# Patient Record
Sex: FEMALE | Race: WHITE | NOT HISPANIC OR LATINO | Employment: OTHER | URBAN - METROPOLITAN AREA
[De-identification: names, ages, dates, MRNs, and addresses within clinical notes are randomized per-mention and may not be internally consistent; named-entity substitution may affect disease eponyms.]

---

## 2019-02-25 DIAGNOSIS — E11.22 TYPE 2 DIABETES MELLITUS WITH CHRONIC KIDNEY DISEASE ON CHRONIC DIALYSIS, WITHOUT LONG-TERM CURRENT USE OF INSULIN: Primary | ICD-10-CM

## 2019-02-25 DIAGNOSIS — N18.6 TYPE 2 DIABETES MELLITUS WITH CHRONIC KIDNEY DISEASE ON CHRONIC DIALYSIS, WITHOUT LONG-TERM CURRENT USE OF INSULIN: Primary | ICD-10-CM

## 2019-02-25 DIAGNOSIS — Z99.2 TYPE 2 DIABETES MELLITUS WITH CHRONIC KIDNEY DISEASE ON CHRONIC DIALYSIS, WITHOUT LONG-TERM CURRENT USE OF INSULIN: Primary | ICD-10-CM

## 2019-03-19 ENCOUNTER — OUTSIDE PLACE OF SERVICE (OUTPATIENT)
Dept: ADMINISTRATIVE | Facility: OTHER | Age: 64
End: 2019-03-19
Payer: COMMERCIAL

## 2019-03-19 PROCEDURE — 49325 LAP REVISION PERM IP CATH: CPT | Mod: ,,, | Performed by: SURGERY

## 2019-03-19 PROCEDURE — 49325 PR LAP REVISE INTRAPERITONEAL CATHETER: ICD-10-PCS | Mod: ,,, | Performed by: SURGERY

## 2019-03-25 ENCOUNTER — OFFICE VISIT (OUTPATIENT)
Dept: INTERNAL MEDICINE | Facility: CLINIC | Age: 64
End: 2019-03-25
Payer: COMMERCIAL

## 2019-03-25 VITALS
BODY MASS INDEX: 40.57 KG/M2 | DIASTOLIC BLOOD PRESSURE: 63 MMHG | SYSTOLIC BLOOD PRESSURE: 105 MMHG | WEIGHT: 229 LBS | OXYGEN SATURATION: 96 % | HEIGHT: 63 IN | TEMPERATURE: 97 F | HEART RATE: 56 BPM

## 2019-03-25 DIAGNOSIS — E11.22 TYPE 2 DIABETES MELLITUS WITH CHRONIC KIDNEY DISEASE ON CHRONIC DIALYSIS, WITHOUT LONG-TERM CURRENT USE OF INSULIN: ICD-10-CM

## 2019-03-25 DIAGNOSIS — J45.909 ASTHMA, UNSPECIFIED ASTHMA SEVERITY, UNSPECIFIED WHETHER COMPLICATED, UNSPECIFIED WHETHER PERSISTENT: ICD-10-CM

## 2019-03-25 DIAGNOSIS — I10 BENIGN ESSENTIAL HTN: ICD-10-CM

## 2019-03-25 DIAGNOSIS — E78.00 PURE HYPERCHOLESTEROLEMIA: Primary | ICD-10-CM

## 2019-03-25 DIAGNOSIS — M79.7 FIBROMYALGIA: ICD-10-CM

## 2019-03-25 DIAGNOSIS — Z99.2 TYPE 2 DIABETES MELLITUS WITH CHRONIC KIDNEY DISEASE ON CHRONIC DIALYSIS, WITHOUT LONG-TERM CURRENT USE OF INSULIN: ICD-10-CM

## 2019-03-25 DIAGNOSIS — E53.8 B12 DEFICIENCY: ICD-10-CM

## 2019-03-25 DIAGNOSIS — N18.6 TYPE 2 DIABETES MELLITUS WITH CHRONIC KIDNEY DISEASE ON CHRONIC DIALYSIS, WITHOUT LONG-TERM CURRENT USE OF INSULIN: ICD-10-CM

## 2019-03-25 LAB — GLUCOSE SERPL-MCNC: 182 MG/DL (ref 70–110)

## 2019-03-25 PROCEDURE — 99214 OFFICE O/P EST MOD 30 MIN: CPT | Mod: S$GLB,,, | Performed by: INTERNAL MEDICINE

## 2019-03-25 PROCEDURE — 82962 POCT GLUCOSE, HAND-HELD DEVICE: ICD-10-PCS | Mod: ,,, | Performed by: INTERNAL MEDICINE

## 2019-03-25 PROCEDURE — 82962 GLUCOSE BLOOD TEST: CPT | Mod: ,,, | Performed by: INTERNAL MEDICINE

## 2019-03-25 PROCEDURE — 99214 PR OFFICE/OUTPT VISIT, EST, LEVL IV, 30-39 MIN: ICD-10-PCS | Mod: S$GLB,,, | Performed by: INTERNAL MEDICINE

## 2019-03-25 RX ORDER — ALLOPURINOL 300 MG/1
TABLET ORAL
Refills: 3 | COMMUNITY
Start: 2019-03-06 | End: 2019-05-13

## 2019-03-25 RX ORDER — AMLODIPINE AND OLMESARTAN MEDOXOMIL 10; 20 MG/1; MG/1
TABLET ORAL
COMMUNITY
Start: 2018-09-17 | End: 2019-03-25

## 2019-03-25 RX ORDER — ASPIRIN 81 MG/1
TABLET ORAL
COMMUNITY
End: 2022-08-23

## 2019-03-25 RX ORDER — AZITHROMYCIN 250 MG/1
TABLET, FILM COATED ORAL
Refills: 0 | COMMUNITY
Start: 2019-03-21 | End: 2019-06-25

## 2019-03-25 RX ORDER — ESCITALOPRAM OXALATE 5 MG/1
TABLET ORAL
COMMUNITY
End: 2019-05-13

## 2019-03-25 RX ORDER — GLIMEPIRIDE 4 MG/1
TABLET ORAL
COMMUNITY
Start: 2018-09-26 | End: 2020-02-28 | Stop reason: SDUPTHER

## 2019-03-25 RX ORDER — ROSUVASTATIN CALCIUM 10 MG/1
TABLET, COATED ORAL
Refills: 4 | COMMUNITY
Start: 2019-03-06 | End: 2019-08-15 | Stop reason: SDUPTHER

## 2019-03-25 RX ORDER — FUROSEMIDE 40 MG/1
TABLET ORAL
Refills: 2 | COMMUNITY
Start: 2018-12-20 | End: 2019-07-01

## 2019-03-25 RX ORDER — PANTOPRAZOLE SODIUM 40 MG/1
TABLET, DELAYED RELEASE ORAL
Refills: 3 | COMMUNITY
Start: 2019-03-21 | End: 2019-05-13

## 2019-03-25 RX ORDER — TIZANIDINE 4 MG/1
4 TABLET ORAL NIGHTLY
Refills: 3 | COMMUNITY
Start: 2019-03-06 | End: 2019-05-31 | Stop reason: SDUPTHER

## 2019-03-25 RX ORDER — DULOXETIN HYDROCHLORIDE 60 MG/1
1 CAPSULE, DELAYED RELEASE ORAL DAILY
COMMUNITY
End: 2019-05-13

## 2019-03-25 RX ORDER — CARVEDILOL 12.5 MG/1
TABLET ORAL
COMMUNITY
End: 2019-05-13

## 2019-03-25 RX ORDER — HYDROCODONE BITARTRATE AND ACETAMINOPHEN 5; 325 MG/1; MG/1
TABLET ORAL
Refills: 0 | COMMUNITY
Start: 2019-03-22 | End: 2019-09-17

## 2019-03-25 RX ORDER — OMEGA-3-ACID ETHYL ESTERS 1 G/1
CAPSULE, LIQUID FILLED ORAL
Refills: 4 | COMMUNITY
Start: 2019-02-28 | End: 2019-08-29 | Stop reason: SDUPTHER

## 2019-03-25 RX ORDER — CYANOCOBALAMIN 1000 UG/ML
1 INJECTION, SOLUTION INTRAMUSCULAR; SUBCUTANEOUS
Refills: 1 | COMMUNITY
Start: 2019-01-14 | End: 2020-02-28 | Stop reason: SDUPTHER

## 2019-03-25 NOTE — PROGRESS NOTES
Subjective:      Patient ID: Tiffany Adams is a 63 y.o. female.    Chief Complaint: Diabetes and Follow-up    HPI: Patient with h/o DM 12 years BS are under good control. Fasting BS are running . Patient HbA1c = 6.1. Patient reports polyuria, polydipsia, no Numbness in hands or feet. Patient is being followed by Ophthalmologist and Podiatrist. Patient has developed Chronic Kidney disease and is recently started on HD about 2 months ago, patient is getting HD Mon/Wed/Friday Patient had Peritoneal Dialysis planned and and Intra-abdominal tubes are placed.       Pt was found to also have borderline B12 levels and low Folate. Patient is started on Folate and b12 and reports improved energy. Patient also had iron infusion in Nov. Patient is off of B12 shots.      Patient has h/o HTN and the BP are onlower side. Patient reports BP dropping during HD.       Patient has h/o fibromyalgia and is taking Cymbalta with much help. Depression is much better controlled with Lexpro. NO crying spells, lack of energy/interest, no feeling guilt and worthlessness.    Review of Systems   Constitutional: Positive for weight loss (10 lbs). Negative for chills, diaphoresis, fever and malaise/fatigue.   HENT: Negative for congestion, ear pain, sinus pain, sore throat and tinnitus.    Eyes: Negative for blurred vision and photophobia.   Respiratory: Negative for cough, hemoptysis, shortness of breath and wheezing.    Cardiovascular: Negative for chest pain, palpitations, orthopnea, leg swelling and PND.   Gastrointestinal: Negative for abdominal pain, blood in stool, constipation, diarrhea, heartburn, melena, nausea and vomiting.   Genitourinary: Negative for dysuria, frequency and urgency.   Musculoskeletal: Negative for back pain, myalgias and neck pain.   Skin: Negative for rash.   Neurological: Positive for dizziness (with changing position quickly. ). Negative for tremors, seizures, loss of consciousness and weakness.    Endo/Heme/Allergies: Negative for polydipsia.   Psychiatric/Behavioral: Negative for depression and hallucinations. The patient does not have insomnia.      Objective:     Physical Exam   Constitutional: She is oriented to person, place, and time. No distress.   Looks pale and lethargic    Neck: No thyromegaly present.   Tunnel catheter on right side of the Neck. Probably Right Jugular Vein.    Cardiovascular: Normal rate, regular rhythm and normal heart sounds.   No murmur heard.  Pulmonary/Chest: Effort normal and breath sounds normal. No respiratory distress. She has no wheezes.   Abdominal: Soft. Bowel sounds are normal. She exhibits no distension. There is no tenderness.   Peritoneal Dialysis catheter is noted in abdomen.    Musculoskeletal: She exhibits no edema.   Lymphadenopathy:     She has no cervical adenopathy.   Neurological: She is alert and oriented to person, place, and time.   Skin: She is not diaphoretic.   Psychiatric: She has a normal mood and affect. Her behavior is normal. Judgment and thought content normal.     Assessment:     1. Type 2 diabetes mellitus without complication, with long-term current use of insulin    2. Pure hypercholesterolemia    3. Type 2 diabetes mellitus with chronic kidney disease on chronic dialysis, without long-term current use of insulin    4. Asthma, unspecified asthma severity, unspecified whether complicated, unspecified whether persistent    5. Malignant neoplasm of left kidney    6. Fibromyalgia       Plan:   Patient blood sugars seem under okay control.  Will continue medication..  Will check LDL again  Patient has history of B12 deficiency.  Will check levels again.  Patient asthma is under control will continue medication  Fibromyalgia is controlled with Cymbalta.  Will continue the medication.  Patient blood pressures are running on the lower side and during hemodialysis blood pressure to drop.  Will stop amlodipine olmesartan  Patient is also losing  weight and that can also contribute with low blood pressure.  Office Visit on 03/25/2019   Component Date Value Ref Range Status    POC Glucose 03/25/2019 182* 70 - 110 MG/DL Final

## 2019-03-26 LAB
ABS NRBC COUNT: 0 X 10 3/UL (ref 0–0.01)
ABSOLUTE BASOPHIL: 0.05 X 10 3/UL (ref 0–0.22)
ABSOLUTE EOSINOPHIL: 0.31 X 10 3/UL (ref 0.04–0.54)
ABSOLUTE IMMATURE GRAN: 0.04 X 10 3/UL (ref 0–0.04)
ABSOLUTE LYMPHOCYTE: 0.61 X 10 3/UL (ref 0.86–4.75)
ABSOLUTE MONOCYTE: 0.64 X 10 3/UL (ref 0.22–1.08)
ALBUMIN SERPL-MCNC: 3.6 G/DL (ref 3.5–5.2)
ALBUMIN/GLOB SERPL ELPH: 1.2 {RATIO} (ref 1–2.7)
ALP ISOS SERPL LEV INH-CCNC: 126 IU/L (ref 35–105)
ALT (SGPT): 8 U/L (ref 0–33)
ANION GAP SERPL CALC-SCNC: 14 MMOL/L (ref 8–17)
AST SERPL-CCNC: 10 U/L (ref 0–32)
BASOPHILS NFR BLD: 0.6 %
BILIRUBIN, TOTAL: 0.32 MG/DL (ref 0–1.2)
BUN/CREAT SERPL: 8.6 (ref 6–20)
CALCIUM SERPL-MCNC: 9.7 MG/DL (ref 8.6–10.2)
CARBON DIOXIDE, CO2: 32 MMOL/L (ref 22–29)
CHLORIDE: 98 MMOL/L (ref 98–107)
CHOLEST SERPL-MSCNC: 119 MG/DL (ref 100–200)
CREAT SERPL-MCNC: 2.69 MG/DL (ref 0.5–0.9)
CREATININE RANDOM URINE: 283.3 MG/DL (ref 28–217)
EOSINOPHIL NFR BLD: 3.4 %
ESTIMATED AVERAGE GLUCOSE: 117 MG/DL
GFR ESTIMATION: 17.76
GLOBULIN: 3.1 G/DL (ref 1.5–4.5)
GLUCOSE: 132 MG/DL (ref 82–115)
HBA1C MFR BLD: 5.7 % (ref 4–6)
HCT VFR BLD AUTO: 30.5 % (ref 37–47)
HDLC SERPL-MCNC: 35 MG/DL
HGB BLD-MCNC: 9.8 G/DL (ref 12–16)
IMMATURE GRANULOCYTES: 0.4 % (ref 0–0.5)
LDL/HDL RATIO: 1.2 (ref 1–3)
LDLC SERPL CALC-MCNC: 42.6 MG/DL (ref 0–100)
LYMPHOCYTES NFR BLD: 6.7 %
MCH RBC QN AUTO: 31.7 PG (ref 27–32)
MCHC RBC AUTO-ENTMCNC: 32.1 G/DL (ref 32–36)
MCV RBC AUTO: 98.7 FL (ref 82–100)
MICROALBUMIN QUANT: 595.2 MG/DL (ref 0–2)
MICROALBUMIN/CREATININE RATIO: 2100.95 UG/MG (ref 0–30)
MONOCYTES NFR BLD: 7.1 %
NEUTROPHILS ABSOLUTE COUNT: 7.42 X 10 3/UL (ref 2.15–7.56)
NEUTROPHILS NFR BLD: 81.8 %
NUCLEATED RED BLOOD CELLS: 0 /100 WBC (ref 0–0.2)
PLATELET # BLD AUTO: 338 X 10 3/UL (ref 135–400)
POTASSIUM: 4 MMOL/L (ref 3.5–5.1)
PROT SNV-MCNC: 6.7 G/DL (ref 6.4–8.3)
RBC # BLD AUTO: 3.09 X 10 6/UL (ref 4.2–5.4)
RDW-SD: 49.5 FL (ref 37–54)
SODIUM: 144 MMOL/L (ref 136–145)
TRIGL SERPL-MCNC: 207 MG/DL (ref 0–150)
TSH SERPL DL<=0.005 MIU/L-ACNC: 2 UIU/ML (ref 0.27–4.2)
UREA NITROGEN (BUN): 23.1 MG/DL (ref 8–23)
WBC # BLD: 9.07 X 10 3/UL (ref 4.3–10.8)

## 2019-03-27 ENCOUNTER — OFFICE VISIT (OUTPATIENT)
Dept: SURGERY | Facility: CLINIC | Age: 64
End: 2019-03-27
Payer: MEDICARE

## 2019-03-27 DIAGNOSIS — Z98.890 POST-OPERATIVE STATE: Primary | ICD-10-CM

## 2019-03-27 PROCEDURE — 99024 PR POST-OP FOLLOW-UP VISIT: ICD-10-PCS | Mod: S$GLB,POP,, | Performed by: SURGERY

## 2019-03-27 PROCEDURE — 99024 POSTOP FOLLOW-UP VISIT: CPT | Mod: S$GLB,POP,, | Performed by: SURGERY

## 2019-03-27 NOTE — PROGRESS NOTES
HPI:  Postoperative revisit status post exterior is a shin of peritoneal dialysis catheter complicated by catheter malfunction due to intra-abdominal adhesions.  Diagnostic laparoscopy was performed in adhesiolysis was performed at that time.  Still could not get the catheter to function properly therefore catheter was reinserted and exteriorized.  Postop she had some problems with what sounded like a mild peritonitis but she is feeling better now not having fevers and much less abdominal tenderness.    PHYSICAL EXAM:  Abdomen is nondistended with mild diffuse tenderness.  Staples were removed from the incisions and Steri-Strips applied there is noted to be some bloody fluid in the peritoneal dialysis catheter segment that is external.  ASSESSMENT:    Appears to be improving after diagnostic laparoscopy and repositioning and reinserting the peritoneal catheter.  PLAN:      She is to get the catheter flushed today by the dialysis nurse I have already talked to them about not putting a large volume in until healing occurs around the catheter insertion site.

## 2019-04-15 ENCOUNTER — OFFICE VISIT (OUTPATIENT)
Dept: SURGERY | Facility: CLINIC | Age: 64
End: 2019-04-15
Payer: MEDICARE

## 2019-04-15 DIAGNOSIS — T85.691D: Primary | ICD-10-CM

## 2019-04-15 PROCEDURE — 99024 PR POST-OP FOLLOW-UP VISIT: ICD-10-PCS | Mod: S$GLB,POP,, | Performed by: SURGERY

## 2019-04-15 PROCEDURE — 99024 POSTOP FOLLOW-UP VISIT: CPT | Mod: S$GLB,POP,, | Performed by: SURGERY

## 2019-04-15 NOTE — PROGRESS NOTES
HPI:  63-year-old white female with peritoneal dialysis catheter recently inserted.  It is not functioning.  Fluid will go in but there is no return.  A plain x-ray was done which does show the catheter in the central abdominal region. She has known history of intra-abdominal adhesions from a recent diagnostic laparoscopy when we exteriorized the catheter.    PHYSICAL EXAM:  Abdomen is obese, soft and nontender.  Catheter exiting left lower quadrant abdominal wall  ASSESSMENT:    Nonfunctioning peritoneal dialysis catheter.  History of intra-abdominal adhesions.  PLAN:      Surgical options discussed with patient. We can put a laparoscoped and see if there is something plug in the catheter or if the adhesions are again 2 severe to allow for peritoneal dialysis to be successful.  If adhesions are again present the way they were the last time that I will remove the catheter in deem her a poor candidate for peritoneal dialysis.

## 2019-04-30 ENCOUNTER — OUTSIDE PLACE OF SERVICE (OUTPATIENT)
Dept: ADMINISTRATIVE | Facility: OTHER | Age: 64
End: 2019-04-30
Payer: COMMERCIAL

## 2019-04-30 PROCEDURE — 49422 PR REMOVAL TUNNELED INTRAPERITONEAL CATHETER: ICD-10-PCS | Mod: ,,, | Performed by: SURGERY

## 2019-04-30 PROCEDURE — 49422 REMOVE TUNNELED IP CATH: CPT | Mod: ,,, | Performed by: SURGERY

## 2019-05-09 DIAGNOSIS — I10 ESSENTIAL HYPERTENSION: Primary | ICD-10-CM

## 2019-05-09 RX ORDER — CARVEDILOL 12.5 MG/1
12.5 TABLET ORAL 2 TIMES DAILY WITH MEALS
Qty: 60 TABLET | Refills: 11 | Status: SHIPPED | OUTPATIENT
Start: 2019-05-09 | End: 2019-05-13

## 2019-05-13 ENCOUNTER — OFFICE VISIT (OUTPATIENT)
Dept: SURGERY | Facility: CLINIC | Age: 64
End: 2019-05-13
Payer: MEDICARE

## 2019-05-13 DIAGNOSIS — K21.9 GASTROESOPHAGEAL REFLUX DISEASE WITHOUT ESOPHAGITIS: ICD-10-CM

## 2019-05-13 DIAGNOSIS — F32.89 OTHER DEPRESSION: ICD-10-CM

## 2019-05-13 DIAGNOSIS — I10 ESSENTIAL HYPERTENSION: ICD-10-CM

## 2019-05-13 DIAGNOSIS — Z98.890 POST-OPERATIVE STATE: Primary | ICD-10-CM

## 2019-05-13 DIAGNOSIS — M79.7 FIBROMYALGIA: ICD-10-CM

## 2019-05-13 DIAGNOSIS — M10.9 GOUT, UNSPECIFIED CAUSE, UNSPECIFIED CHRONICITY, UNSPECIFIED SITE: Primary | ICD-10-CM

## 2019-05-13 PROCEDURE — 99024 PR POST-OP FOLLOW-UP VISIT: ICD-10-PCS | Mod: S$GLB,POP,, | Performed by: SURGERY

## 2019-05-13 PROCEDURE — 99024 POSTOP FOLLOW-UP VISIT: CPT | Mod: S$GLB,POP,, | Performed by: SURGERY

## 2019-05-13 RX ORDER — CARVEDILOL 12.5 MG/1
12.5 TABLET ORAL 2 TIMES DAILY WITH MEALS
Qty: 60 TABLET | Refills: 11 | Status: SHIPPED | OUTPATIENT
Start: 2019-05-13 | End: 2020-05-12

## 2019-05-13 RX ORDER — DULOXETIN HYDROCHLORIDE 60 MG/1
60 CAPSULE, DELAYED RELEASE ORAL DAILY
Qty: 30 CAPSULE | Refills: 11 | Status: SHIPPED | OUTPATIENT
Start: 2019-05-13 | End: 2020-05-06 | Stop reason: SDUPTHER

## 2019-05-13 RX ORDER — PANTOPRAZOLE SODIUM 40 MG/1
40 TABLET, DELAYED RELEASE ORAL DAILY
Qty: 30 TABLET | Refills: 11 | Status: SHIPPED | OUTPATIENT
Start: 2019-05-13 | End: 2020-02-28 | Stop reason: SDUPTHER

## 2019-05-13 RX ORDER — ALLOPURINOL 300 MG/1
300 TABLET ORAL DAILY
Qty: 30 TABLET | Refills: 11 | Status: SHIPPED | OUTPATIENT
Start: 2019-05-13 | End: 2019-07-08

## 2019-05-13 RX ORDER — TIZANIDINE 4 MG/1
4 TABLET ORAL EVERY 6 HOURS PRN
Qty: 30 TABLET | Refills: 1 | OUTPATIENT
Start: 2019-05-13 | End: 2019-05-23

## 2019-05-13 NOTE — PROGRESS NOTES
HPI:  Postoperative visit status post removal of peritoneal dialysis catheter.  Here for staple removal    PHYSICAL EXAM:  Abdomen soft nontender incisions with some redness related to the staples.  All staples are removed and Steri-Strips applied  ASSESSMENT:    Stable postoperative course  PLAN:    Pre visit as needed.  Patient deemed non candidate for peritoneal dialysis due to intra-abdominal adhesions.

## 2019-05-16 DIAGNOSIS — E11.22 TYPE 2 DIABETES MELLITUS WITH END-STAGE RENAL DISEASE: Primary | ICD-10-CM

## 2019-05-16 DIAGNOSIS — N18.6 TYPE 2 DIABETES MELLITUS WITH END-STAGE RENAL DISEASE: Primary | ICD-10-CM

## 2019-05-17 RX ORDER — LANCETS 33 GAUGE
EACH MISCELLANEOUS
Qty: 100 EACH | Refills: 3 | Status: SHIPPED | OUTPATIENT
Start: 2019-05-17 | End: 2023-01-26 | Stop reason: SDUPTHER

## 2019-05-17 RX ORDER — DEXTROSE 4 G
TABLET,CHEWABLE ORAL
Qty: 1 EACH | Refills: 0 | Status: SHIPPED | OUTPATIENT
Start: 2019-05-17 | End: 2019-07-08

## 2019-05-31 DIAGNOSIS — M79.7 FIBROMYALGIA: Primary | ICD-10-CM

## 2019-05-31 RX ORDER — TIZANIDINE 4 MG/1
4 TABLET ORAL NIGHTLY
Qty: 30 TABLET | Refills: 3 | Status: SHIPPED | OUTPATIENT
Start: 2019-05-31 | End: 2019-10-22 | Stop reason: SDUPTHER

## 2019-06-13 ENCOUNTER — TELEPHONE (OUTPATIENT)
Dept: INTERNAL MEDICINE | Facility: CLINIC | Age: 64
End: 2019-06-13

## 2019-06-13 NOTE — TELEPHONE ENCOUNTER
----- Message from David Salas MD sent at 6/12/2019  5:41 PM CDT -----  Please check on patient and make an appointment if needed.     ----- Message -----  From: Norma Teran  Sent: 6/12/2019   8:24 AM  To: David Salas MD

## 2019-06-25 ENCOUNTER — OFFICE VISIT (OUTPATIENT)
Dept: INTERNAL MEDICINE | Facility: CLINIC | Age: 64
End: 2019-06-25
Payer: MEDICARE

## 2019-06-25 VITALS
OXYGEN SATURATION: 96 % | TEMPERATURE: 98 F | DIASTOLIC BLOOD PRESSURE: 77 MMHG | RESPIRATION RATE: 16 BRPM | SYSTOLIC BLOOD PRESSURE: 151 MMHG | BODY MASS INDEX: 37.9 KG/M2 | HEART RATE: 70 BPM | WEIGHT: 222 LBS | HEIGHT: 64 IN

## 2019-06-25 DIAGNOSIS — E11.22 TYPE 2 DIABETES MELLITUS WITH CHRONIC KIDNEY DISEASE ON CHRONIC DIALYSIS, WITHOUT LONG-TERM CURRENT USE OF INSULIN: Primary | ICD-10-CM

## 2019-06-25 DIAGNOSIS — R53.83 FATIGUE, UNSPECIFIED TYPE: ICD-10-CM

## 2019-06-25 DIAGNOSIS — R93.5 ABNORMAL CT SCAN, PELVIS: ICD-10-CM

## 2019-06-25 DIAGNOSIS — Z99.2 TYPE 2 DIABETES MELLITUS WITH CHRONIC KIDNEY DISEASE ON CHRONIC DIALYSIS, WITHOUT LONG-TERM CURRENT USE OF INSULIN: Primary | ICD-10-CM

## 2019-06-25 DIAGNOSIS — R41.0 DELIRIUM: ICD-10-CM

## 2019-06-25 DIAGNOSIS — N18.6 TYPE 2 DIABETES MELLITUS WITH CHRONIC KIDNEY DISEASE ON CHRONIC DIALYSIS, WITHOUT LONG-TERM CURRENT USE OF INSULIN: Primary | ICD-10-CM

## 2019-06-25 PROBLEM — Z85.528 HISTORY OF KIDNEY CANCER: Status: ACTIVE | Noted: 2018-10-08

## 2019-06-25 PROBLEM — I24.9 ACUTE CORONARY SYNDROME: Status: ACTIVE | Noted: 2019-06-25

## 2019-06-25 PROBLEM — G00.9 ACUTE BACTERIAL MENINGITIS: Status: ACTIVE | Noted: 2019-06-25

## 2019-06-25 PROBLEM — G47.33 OBSTRUCTIVE SLEEP APNEA TREATED WITH CONTINUOUS POSITIVE AIRWAY PRESSURE (CPAP): Status: ACTIVE | Noted: 2019-06-25

## 2019-06-25 PROBLEM — E11.42 DIABETIC POLYNEUROPATHY ASSOCIATED WITH TYPE 2 DIABETES MELLITUS: Status: ACTIVE | Noted: 2018-07-23

## 2019-06-25 PROBLEM — I10 ESSENTIAL HYPERTENSION: Status: ACTIVE | Noted: 2018-10-08

## 2019-06-25 LAB
ABS NRBC COUNT: 0 X 10 3/UL (ref 0–0.01)
ABSOLUTE BASOPHIL: 0.06 X 10 3/UL (ref 0–0.22)
ABSOLUTE EOSINOPHIL: 0.25 X 10 3/UL (ref 0.04–0.54)
ABSOLUTE IMMATURE GRAN: 0.12 X 10 3/UL (ref 0–0.04)
ABSOLUTE LYMPHOCYTE: 0.67 X 10 3/UL (ref 0.86–4.75)
ABSOLUTE MONOCYTE: 0.61 X 10 3/UL (ref 0.22–1.08)
BASOPHILS NFR BLD: 0.7 %
EOSINOPHIL NFR BLD: 2.9 %
ESTIMATED AVERAGE GLUCOSE: 127 MG/DL
HBA1C MFR BLD: 6.1 % (ref 4–6)
HCT VFR BLD AUTO: 40.1 % (ref 37–47)
HGB BLD-MCNC: 13.3 G/DL (ref 12–16)
IMMATURE GRANULOCYTES: 1.4 % (ref 0–0.5)
LYMPHOCYTES NFR BLD: 7.7 %
MCH RBC QN AUTO: 32.4 PG (ref 27–32)
MCHC RBC AUTO-ENTMCNC: 33.2 G/DL (ref 32–36)
MCV RBC AUTO: 97.8 FL (ref 82–100)
MONOCYTES NFR BLD: 7 %
NEUTROPHILS ABSOLUTE COUNT: 7.01 X 10 3/UL (ref 2.15–7.56)
NEUTROPHILS NFR BLD: 80.3 %
NUCLEATED RED BLOOD CELLS: 0 /100 WBC (ref 0–0.2)
PLATELET # BLD AUTO: 258 X 10 3/UL (ref 135–400)
RBC # BLD AUTO: 4.1 X 10 6/UL (ref 4.2–5.4)
RDW-SD: 51.6 FL (ref 37–54)
TSH SERPL DL<=0.005 MIU/L-ACNC: 1.9 UIU/ML (ref 0.27–4.2)
WBC # BLD: 8.72 X 10 3/UL (ref 4.3–10.8)

## 2019-06-25 PROCEDURE — 99214 PR OFFICE/OUTPT VISIT, EST, LEVL IV, 30-39 MIN: ICD-10-PCS | Mod: S$GLB,,, | Performed by: INTERNAL MEDICINE

## 2019-06-25 PROCEDURE — 99214 OFFICE O/P EST MOD 30 MIN: CPT | Mod: S$GLB,,, | Performed by: INTERNAL MEDICINE

## 2019-06-25 RX ORDER — LOSARTAN POTASSIUM 50 MG/1
1 TABLET ORAL DAILY
Refills: 3 | COMMUNITY
Start: 2019-05-22 | End: 2019-07-08

## 2019-06-25 RX ORDER — ACYCLOVIR 200 MG/1
1 CAPSULE ORAL 2 TIMES DAILY
Refills: 0 | COMMUNITY
Start: 2019-06-18 | End: 2019-07-01

## 2019-06-25 RX ORDER — ESCITALOPRAM OXALATE 5 MG/1
1 TABLET ORAL DAILY
Refills: 5 | COMMUNITY
Start: 2019-06-05 | End: 2019-07-01

## 2019-06-25 RX ORDER — CEFPODOXIME PROXETIL 200 MG/1
1 TABLET, FILM COATED ORAL DAILY
Refills: 0 | COMMUNITY
Start: 2019-06-18 | End: 2019-07-01

## 2019-06-25 NOTE — PROGRESS NOTES
Subjective:      Patient ID: Tiffany Adams is a 63 y.o. female.    Chief Complaint: Meningitis (pt stated she just cant recouperate from having this...  no energy at all and jut tired all the time) and Fatigue (from the meningitis )    HPI:  Patient was recently admitted to hospital with altered mental status, patient was treated for viral and bacterial meningitis with acyclovir and broad-spectrum antibiotics + metabolic encephalitis was also a differential.  Patient had aggressive hemodialysis treatment during her admission.  During hospital stay patient also had acute respiratory failure with hypoxia.  Patient was in ICU requiring oxygen therapy and that improved with improved mental status.  The patient reports no more fever, chills but still reports feeling fatigued. Patient reports she is feeling better but still has confusion and her focus is not same as before. Patient was feeling that the room was changing the shape and has stopped, patient has difficulty focusing and is not able to finish simple task. Patient reports fatigued all the time. Patient can hardly walk 20 steps before getting Fatigued. Patient reports sleeping well and using CPAP machine regularly. No fever but some chills.       Review of Systems   Constitutional: Positive for malaise/fatigue. Negative for chills, diaphoresis, fever and weight loss.   HENT: Negative for congestion, ear pain, sinus pain, sore throat and tinnitus.    Eyes: Negative for blurred vision and photophobia.   Respiratory: Positive for shortness of breath (On exertion). Negative for cough, hemoptysis and wheezing.    Cardiovascular: Negative for chest pain, palpitations, orthopnea, leg swelling and PND.   Gastrointestinal: Negative for abdominal pain, blood in stool, constipation, diarrhea, heartburn, melena, nausea and vomiting.   Genitourinary: Negative for dysuria, frequency and urgency.   Musculoskeletal: Negative for back pain, myalgias and neck pain.   Skin:  Negative for rash.   Neurological: Negative for dizziness, tremors, seizures, loss of consciousness and weakness.   Endo/Heme/Allergies: Negative for polydipsia.   Psychiatric/Behavioral: Negative for depression and hallucinations. The patient does not have insomnia.      Objective:     Physical Exam   Constitutional: She is oriented to person, place, and time. No distress.   Neck: No thyromegaly present.   Cardiovascular: Normal rate, regular rhythm and normal heart sounds.   No murmur heard.  Pulmonary/Chest: Effort normal and breath sounds normal. No respiratory distress. She has no wheezes. She exhibits no tenderness.   After walking for 5 min oxygen saturation dropped to 92%.   Patient was becoming a little short of breath.    Abdominal: Soft. Bowel sounds are normal. She exhibits no distension. There is no tenderness.   Musculoskeletal: She exhibits no edema or tenderness.   Lymphadenopathy:     She has no cervical adenopathy.   Neurological: She is alert and oriented to person, place, and time. No cranial nerve deficit or sensory deficit.   Skin: She is not diaphoretic.   Psychiatric: She has a normal mood and affect. Her behavior is normal. Judgment and thought content normal.     Assessment:     1. Type 2 diabetes mellitus with chronic kidney disease on chronic dialysis, without long-term current use of insulin    2. Fatigue, unspecified type    3. Delirium    4. Abnormal CT scan, pelvis       Plan:   Patient is on chronic hemodialysis and that can be contributing factor for encephalitis.   The patient's CSF showed increased WBC count.  Patient has almost finished antibiotics and seems to be improving.   Patient seemed quite Deconditioned and need home health for physical therapy and mobility.  Will check CBCs CMP and TSH to evaluate fatigued further.   Patient  was having delusions probably secondary to acute delirium.  Symptoms seem to be improving  No further workup at this time.  CT scan from hospital  showed thickening of the vaginal wall & questionable mass..  Will refer to gynecology for further evaluation  Blood sugars seem under control.  Will check A1c.   Because of patient deconditioning will keep patient off of work for another week.

## 2019-06-26 DIAGNOSIS — R53.83 FATIGUE, UNSPECIFIED TYPE: Primary | ICD-10-CM

## 2019-06-26 LAB
ALBUMIN SERPL-MCNC: 3.8 G/DL (ref 3.5–5.2)
ALBUMIN/GLOB SERPL ELPH: 1.9 {RATIO} (ref 1–2.7)
ALP ISOS SERPL LEV INH-CCNC: 130 IU/L (ref 35–105)
ALT (SGPT): 20 U/L (ref 0–33)
ANION GAP SERPL CALC-SCNC: 18 MMOL/L (ref 8–17)
AST SERPL-CCNC: 21 U/L (ref 0–32)
BILIRUBIN, TOTAL: 0.32 MG/DL (ref 0–1.2)
BUN/CREAT SERPL: 11.9 (ref 6–20)
CALCIUM SERPL-MCNC: 9.3 MG/DL (ref 8.6–10.2)
CARBON DIOXIDE, CO2: 24 MMOL/L (ref 22–29)
CHLORIDE: 107 MMOL/L (ref 98–107)
CREAT SERPL-MCNC: 3.69 MG/DL (ref 0.5–0.9)
GFR ESTIMATION: 12.33
GLOBULIN: 2 G/DL (ref 1.5–4.5)
GLUCOSE: 146 MG/DL (ref 82–115)
POTASSIUM: 5.8 MMOL/L (ref 3.5–5.1)
PROT SNV-MCNC: 5.8 G/DL (ref 6.4–8.3)
SODIUM: 149 MMOL/L (ref 136–145)
UREA NITROGEN (BUN): 43.9 MG/DL (ref 8–23)

## 2019-07-01 ENCOUNTER — OFFICE VISIT (OUTPATIENT)
Dept: OBSTETRICS AND GYNECOLOGY | Facility: CLINIC | Age: 64
End: 2019-07-01
Payer: COMMERCIAL

## 2019-07-01 ENCOUNTER — OFFICE VISIT (OUTPATIENT)
Dept: INTERNAL MEDICINE | Facility: CLINIC | Age: 64
End: 2019-07-01
Payer: COMMERCIAL

## 2019-07-01 VITALS
OXYGEN SATURATION: 95 % | TEMPERATURE: 98 F | BODY MASS INDEX: 38.07 KG/M2 | DIASTOLIC BLOOD PRESSURE: 68 MMHG | HEIGHT: 64 IN | SYSTOLIC BLOOD PRESSURE: 139 MMHG | HEART RATE: 73 BPM | RESPIRATION RATE: 16 BRPM | WEIGHT: 223 LBS

## 2019-07-01 VITALS
DIASTOLIC BLOOD PRESSURE: 68 MMHG | SYSTOLIC BLOOD PRESSURE: 139 MMHG | BODY MASS INDEX: 38.07 KG/M2 | HEIGHT: 64 IN | WEIGHT: 223 LBS

## 2019-07-01 DIAGNOSIS — R19.00 PELVIC MASS: Primary | ICD-10-CM

## 2019-07-01 DIAGNOSIS — R06.02 SHORTNESS OF BREATH: ICD-10-CM

## 2019-07-01 DIAGNOSIS — E11.22 TYPE 2 DIABETES MELLITUS WITH CHRONIC KIDNEY DISEASE ON CHRONIC DIALYSIS, WITHOUT LONG-TERM CURRENT USE OF INSULIN: Primary | ICD-10-CM

## 2019-07-01 DIAGNOSIS — R35.1 NOCTURIA: ICD-10-CM

## 2019-07-01 DIAGNOSIS — Z99.2 TYPE 2 DIABETES MELLITUS WITH CHRONIC KIDNEY DISEASE ON CHRONIC DIALYSIS, WITHOUT LONG-TERM CURRENT USE OF INSULIN: Primary | ICD-10-CM

## 2019-07-01 DIAGNOSIS — N39.0 URINARY TRACT INFECTION WITH HEMATURIA, SITE UNSPECIFIED: Primary | ICD-10-CM

## 2019-07-01 DIAGNOSIS — R10.30 LOWER ABDOMINAL PAIN: Primary | ICD-10-CM

## 2019-07-01 DIAGNOSIS — N18.6 TYPE 2 DIABETES MELLITUS WITH CHRONIC KIDNEY DISEASE ON CHRONIC DIALYSIS, WITHOUT LONG-TERM CURRENT USE OF INSULIN: Primary | ICD-10-CM

## 2019-07-01 DIAGNOSIS — M25.551 PAIN OF RIGHT HIP JOINT: ICD-10-CM

## 2019-07-01 DIAGNOSIS — R31.9 URINARY TRACT INFECTION WITH HEMATURIA, SITE UNSPECIFIED: Primary | ICD-10-CM

## 2019-07-01 PROCEDURE — 99214 OFFICE O/P EST MOD 30 MIN: CPT | Mod: S$GLB,,, | Performed by: INTERNAL MEDICINE

## 2019-07-01 PROCEDURE — 99204 OFFICE O/P NEW MOD 45 MIN: CPT | Mod: S$GLB,,, | Performed by: OBSTETRICS & GYNECOLOGY

## 2019-07-01 PROCEDURE — 99214 PR OFFICE/OUTPT VISIT, EST, LEVL IV, 30-39 MIN: ICD-10-PCS | Mod: S$GLB,,, | Performed by: INTERNAL MEDICINE

## 2019-07-01 PROCEDURE — 99204 PR OFFICE/OUTPT VISIT, NEW, LEVL IV, 45-59 MIN: ICD-10-PCS | Mod: S$GLB,,, | Performed by: OBSTETRICS & GYNECOLOGY

## 2019-07-01 RX ORDER — CEPHALEXIN 500 MG/1
500 CAPSULE ORAL EVERY 12 HOURS
Qty: 14 CAPSULE | Refills: 0 | Status: SHIPPED | OUTPATIENT
Start: 2019-07-01 | End: 2019-07-08

## 2019-07-01 NOTE — PROGRESS NOTES
Subjective:      Patient ID: Tiffany Adams is a 63 y.o. female.    Chief Complaint: Fatigue and Leg Pain     Patient was recently admitted to hospital with altered mental status, patient was treated for viral and bacterial meningitis with acyclovir and broad-spectrum antibiotics + metabolic encephalitis was also a differential.  The patient even after discharge had some delusions/hallucination.  Patient has poor concentration and was thinking the room is changing shapes.  These symptoms have much improved.  Patient also had shortness of breath that seems much better than before.  Patient had deconditioning and was not able to walk more than 20 steps.  Patient reports it is getting better but is not undergoing physical therapy at this time.  Patient is getting fatigued/tiered with minimal exertion/activity.  The patient denies any fever or chills    Patient reports right leg pain x 2 weeks.  Pain is in gluteal area, intermittent, mild-to-moderate in intensity, no radiation of pain, no numbness or tingling.  No weakness in leg.     Patient CT of the abdomen and pelvis also showed questionable thickening of vaginal wall and pelvic mass.  Patient is referred to gynecologist an appointment is made for August.  The called Dr. chisholm and requested an early appointment and he was kind enough to accommodate patient for tomorrow.     Review of Systems   Constitutional: Positive for fatigue and malaise/fatigue. Negative for chills, diaphoresis, fever and weight loss.   HENT: Negative for congestion, ear pain, sinus pain, sore throat and tinnitus.    Eyes: Negative for blurred vision and photophobia.   Respiratory: Positive for shortness of breath (On exertion). Negative for cough, hemoptysis and wheezing.    Cardiovascular: Negative for chest pain, palpitations, orthopnea, leg swelling and PND.   Gastrointestinal: Negative for abdominal pain, blood in stool, constipation, diarrhea, heartburn, melena, nausea and vomiting.    Genitourinary: Negative for dysuria, frequency and urgency.   Musculoskeletal: Negative for back pain, myalgias and neck pain.   Skin: Negative for rash.   Neurological: Negative for dizziness, tremors, seizures, loss of consciousness and weakness.   Endo/Heme/Allergies: Negative for polydipsia.   Psychiatric/Behavioral: Negative for depression and hallucinations. The patient does not have insomnia.      Objective:     Physical Exam   Constitutional: She is oriented to person, place, and time. No distress.   Neck: No thyromegaly present.   Cardiovascular: Normal rate, regular rhythm and normal heart sounds.   No murmur heard.  Pulmonary/Chest: Effort normal and breath sounds normal. No respiratory distress. She has no wheezes. She exhibits no tenderness.   Abdominal: Soft. Bowel sounds are normal. She exhibits no distension. There is no tenderness.   Musculoskeletal: She exhibits no edema or tenderness.   Mid gluteal area tenderness + right hip range of motion is normal.    Lymphadenopathy:     She has no cervical adenopathy.   Neurological: She is alert and oriented to person, place, and time. No cranial nerve deficit or sensory deficit.   Skin: She is not diaphoretic.   Psychiatric: She has a normal mood and affect. Her behavior is normal. Judgment and thought content normal.     Assessment:     1. Type 2 diabetes mellitus with chronic kidney disease on chronic dialysis, without long-term current use of insulin    2. Shortness of breath    3. Nocturia       Plan:   Patient blood sugars are under good control.  Will continue medication  Patient seemed to have deconditioning and need aggressive physical therapy.   Will order home health to start physical therapy again.   Patient shortness of breath is much improved than before.  Will continue to monitor  Patient reports some nocturia.  Will check urinalysis.   Patient has some tenderness in gluteal area.  Range of motion is normal.   Patient to take Tylenol for  pain + physical therapy should help.

## 2019-07-01 NOTE — PROGRESS NOTES
Subjective:       Patient ID: Tiffany Adams is a 63 y.o. female.    Chief Complaint:  No chief complaint on file.      History of Present Illness  Pt was in hospital and had ct- showed suspicous area at top of vag.  No sx- no bleednig.  Still has uterus.  H/o renal cancer with neprhectomy.        GYN & OB History  No LMP recorded. Patient is postmenopausal.   Date of Last Pap: No result found    OB History    Para Term  AB Living   2 2           SAB TAB Ectopic Multiple Live Births                  # Outcome Date GA Lbr Beto/2nd Weight Sex Delivery Anes PTL Lv   2 Para            1 Para                Review of Systems  Review of Systems   Constitutional: Negative for chills and fever.   Respiratory: Positive for shortness of breath.    Cardiovascular: Negative for chest pain.   Gastrointestinal: Negative for abdominal pain, blood in stool, constipation, diarrhea, nausea, vomiting and reflux.   Genitourinary: Negative for dysmenorrhea, dyspareunia, dysuria, hematuria, hot flashes, menorrhagia, menstrual problem, pelvic pain, vaginal bleeding, vaginal discharge, postcoital bleeding and vaginal dryness.   Musculoskeletal: Negative for arthralgias and joint swelling.   Integumentary:  Negative for rash and hair changes.   Psychiatric/Behavioral: Negative for depression. The patient is not nervous/anxious.            Objective:    Physical Exam:   Constitutional: She appears well-developed and well-nourished. No distress.    HENT:   Head: Normocephalic and atraumatic.    Eyes: Conjunctivae and EOM are normal.    Neck: No tracheal deviation present. No thyromegaly present.    Cardiovascular: Exam reveals no clubbing, no cyanosis and no edema.     Pulmonary/Chest: Effort normal. No respiratory distress.        Abdominal: Soft. She exhibits no distension and no mass. There is no tenderness. There is no rebound and no guarding. No hernia.     Genitourinary: Rectum normal, vagina normal and uterus normal.  Pelvic exam was performed with patient supine. There is no rash, tenderness, lesion or injury on the right labia. There is no rash, tenderness, lesion or injury on the left labia. Cervix is normal. Right adnexum displays no mass, no tenderness and no fullness. Left adnexum displays no mass, no tenderness and no fullness.                Skin: She is not diaphoretic. No cyanosis. Nails show no clubbing.           Assessment:        1. Pelvic mass               Plan:      Need US  Will follow up with pt after that

## 2019-07-02 ENCOUNTER — PROCEDURE VISIT (OUTPATIENT)
Dept: OBSTETRICS AND GYNECOLOGY | Facility: CLINIC | Age: 64
End: 2019-07-02
Payer: COMMERCIAL

## 2019-07-02 ENCOUNTER — TELEPHONE (OUTPATIENT)
Dept: INTERNAL MEDICINE | Facility: CLINIC | Age: 64
End: 2019-07-02

## 2019-07-02 DIAGNOSIS — R10.30 LOWER ABDOMINAL PAIN: ICD-10-CM

## 2019-07-02 PROCEDURE — 76830 TRANSVAGINAL US NON-OB: CPT | Mod: S$GLB,,, | Performed by: OBSTETRICS & GYNECOLOGY

## 2019-07-02 PROCEDURE — 76830 PR  ECHOGRAPHY,TRANSVAGINAL: ICD-10-PCS | Mod: S$GLB,,, | Performed by: OBSTETRICS & GYNECOLOGY

## 2019-07-02 NOTE — TELEPHONE ENCOUNTER
----- Message from Norma Teran sent at 7/1/2019  8:48 AM CDT -----  Dr. Salas wants PT added to patient's home health ([place] - [phone]), due to deconditioning.  Can you give them a verbal order?

## 2019-07-05 DIAGNOSIS — N39.0 URINARY TRACT INFECTION WITHOUT HEMATURIA, SITE UNSPECIFIED: ICD-10-CM

## 2019-07-05 DIAGNOSIS — R31.9 URINARY TRACT INFECTION WITH HEMATURIA, SITE UNSPECIFIED: Primary | ICD-10-CM

## 2019-07-05 DIAGNOSIS — N39.0 URINARY TRACT INFECTION WITH HEMATURIA, SITE UNSPECIFIED: Primary | ICD-10-CM

## 2019-07-05 RX ORDER — LEVOFLOXACIN 250 MG/1
250 TABLET ORAL
Qty: 4 TABLET | Refills: 0 | Status: SHIPPED | OUTPATIENT
Start: 2019-07-05 | End: 2019-09-17

## 2019-07-08 ENCOUNTER — OFFICE VISIT (OUTPATIENT)
Dept: INTERNAL MEDICINE | Facility: CLINIC | Age: 64
End: 2019-07-08
Payer: COMMERCIAL

## 2019-07-08 VITALS
DIASTOLIC BLOOD PRESSURE: 72 MMHG | TEMPERATURE: 98 F | SYSTOLIC BLOOD PRESSURE: 118 MMHG | WEIGHT: 221 LBS | HEIGHT: 64 IN | RESPIRATION RATE: 16 BRPM | HEART RATE: 76 BPM | OXYGEN SATURATION: 95 % | BODY MASS INDEX: 37.73 KG/M2

## 2019-07-08 DIAGNOSIS — E11.22 TYPE 2 DIABETES MELLITUS WITH CHRONIC KIDNEY DISEASE ON CHRONIC DIALYSIS, WITHOUT LONG-TERM CURRENT USE OF INSULIN: Primary | ICD-10-CM

## 2019-07-08 DIAGNOSIS — Z99.2 TYPE 2 DIABETES MELLITUS WITH CHRONIC KIDNEY DISEASE ON CHRONIC DIALYSIS, WITHOUT LONG-TERM CURRENT USE OF INSULIN: Primary | ICD-10-CM

## 2019-07-08 DIAGNOSIS — N18.6 TYPE 2 DIABETES MELLITUS WITH CHRONIC KIDNEY DISEASE ON CHRONIC DIALYSIS, WITHOUT LONG-TERM CURRENT USE OF INSULIN: Primary | ICD-10-CM

## 2019-07-08 DIAGNOSIS — N39.0 URINARY TRACT INFECTION WITHOUT HEMATURIA, SITE UNSPECIFIED: ICD-10-CM

## 2019-07-08 PROCEDURE — 99213 PR OFFICE/OUTPT VISIT, EST, LEVL III, 20-29 MIN: ICD-10-PCS | Mod: S$GLB,,, | Performed by: INTERNAL MEDICINE

## 2019-07-08 PROCEDURE — 99213 OFFICE O/P EST LOW 20 MIN: CPT | Mod: S$GLB,,, | Performed by: INTERNAL MEDICINE

## 2019-07-08 NOTE — PROGRESS NOTES
Subjective:      Patient ID: Tiffany Adams is a 63 y.o. female.    Chief Complaint: Follow-up     Patient was recently admitted to hospital with altered mental status, patient was treated for viral and bacterial meningitis with acyclovir and broad-spectrum antibiotics + metabolic encephalitis was also a differential.  The patient even after discharge had some delusions/hallucination.  Patient had poor concentration and was thinking the room is changing shapes.  These symptoms have much improved.  Patient also had shortness of breath that seems much better than before and so is her deconditioning. Patient si able to walk without getting short of breath.  Patient reports it is getting better but is not undergoing physical therapy at this time. Patient think she is better and wants to go back to work.     Patient CT of the abdomen and pelvis also showed questionable thickening of vaginal wall and pelvic mass.  Patient is referred to gynecologist and had an US done and that was Normal.     Patient was found to have a UTI and is being treated with Levoquin. Patient reports tolerating the medicine well.      Review of Systems   Constitutional: Negative for chills, diaphoresis, fever, malaise/fatigue and weight loss.   HENT: Negative for congestion, ear pain, sinus pain, sore throat and tinnitus.    Eyes: Negative for blurred vision and photophobia.   Respiratory: Negative for cough, hemoptysis, shortness of breath and wheezing.    Cardiovascular: Negative for chest pain, palpitations, orthopnea, leg swelling and PND.   Gastrointestinal: Negative for abdominal pain, blood in stool, constipation, diarrhea, heartburn, melena, nausea and vomiting.   Genitourinary: Negative for dysuria, frequency and urgency.   Musculoskeletal: Negative for back pain, myalgias and neck pain.   Skin: Negative for rash.   Neurological: Negative for dizziness, tremors, seizures, loss of consciousness and weakness.   Endo/Heme/Allergies:  Negative for polydipsia.   Psychiatric/Behavioral: Negative for depression and hallucinations. The patient does not have insomnia.      Objective:     Physical Exam   Constitutional: She is oriented to person, place, and time. No distress.   Neck: No thyromegaly present.   Cardiovascular: Normal rate, regular rhythm and normal heart sounds.   No murmur heard.  Pulmonary/Chest: Effort normal and breath sounds normal. No respiratory distress. She has no wheezes. She exhibits no tenderness.   Abdominal: Soft. Bowel sounds are normal. She exhibits no distension. There is no tenderness.   Musculoskeletal: She exhibits no edema or tenderness.   Lymphadenopathy:     She has no cervical adenopathy.   Neurological: She is alert and oriented to person, place, and time. No cranial nerve deficit or sensory deficit.   Skin: She is not diaphoretic.   Psychiatric: She has a normal mood and affect. Her behavior is normal. Judgment and thought content normal.     Assessment:     1. Type 2 diabetes mellitus with chronic kidney disease on chronic dialysis, without long-term current use of insulin    2. Urinary tract infection without hematuria, site unspecified       Plan:   Patient blood sugars are under good control.  Will continue medication  Patient shortness of breath is much improved + so is her deconditioning.   Patient bilateral leg strength is improved and is able to walk without any problem.    Patient wants to return to work.  Will allow patient to start working.   Patient did not have any symptom of UTI and is on Levaquin.  Advised patient to take medicine as prescribed

## 2019-07-12 ENCOUNTER — TELEPHONE (OUTPATIENT)
Dept: INTERNAL MEDICINE | Facility: CLINIC | Age: 64
End: 2019-07-12

## 2019-07-12 NOTE — TELEPHONE ENCOUNTER
----- Message from David Salas MD sent at 7/12/2019 12:27 PM CDT -----  Please fax the documents    ----- Message -----  From: Norma Teran  Sent: 7/11/2019  10:03 AM  To: David Salas MD    Patient requesting a note stating she's released back to work.  It can be faxed to the number below.    [facility] fax: Ana María Ibrahim -  - [phone]    She's also asking when her disability forms will be filled out and faxed back because she hasn't gotten paid in a month.

## 2019-07-12 NOTE — TELEPHONE ENCOUNTER
Paperwork filled out by Dr. Salas.  Faxed to patient's daughter so patient can sign the authorization to be released to [facility].  Also sent return to work to patient's  rep at [facility].

## 2019-08-15 DIAGNOSIS — E78.00 PURE HYPERCHOLESTEROLEMIA: Primary | ICD-10-CM

## 2019-08-15 RX ORDER — ROSUVASTATIN CALCIUM 10 MG/1
10 TABLET, COATED ORAL DAILY
Qty: 30 TABLET | Refills: 6 | Status: SHIPPED | OUTPATIENT
Start: 2019-08-15 | End: 2022-02-08 | Stop reason: SDUPTHER

## 2019-08-29 DIAGNOSIS — E11.22 TYPE 2 DIABETES MELLITUS WITH CHRONIC KIDNEY DISEASE ON CHRONIC DIALYSIS, WITHOUT LONG-TERM CURRENT USE OF INSULIN: Primary | ICD-10-CM

## 2019-08-29 DIAGNOSIS — Z99.2 TYPE 2 DIABETES MELLITUS WITH CHRONIC KIDNEY DISEASE ON CHRONIC DIALYSIS, WITHOUT LONG-TERM CURRENT USE OF INSULIN: Primary | ICD-10-CM

## 2019-08-29 DIAGNOSIS — E78.00 PURE HYPERCHOLESTEROLEMIA: ICD-10-CM

## 2019-08-29 DIAGNOSIS — N18.6 TYPE 2 DIABETES MELLITUS WITH CHRONIC KIDNEY DISEASE ON CHRONIC DIALYSIS, WITHOUT LONG-TERM CURRENT USE OF INSULIN: Primary | ICD-10-CM

## 2019-08-30 RX ORDER — ESCITALOPRAM OXALATE 5 MG/1
5 TABLET ORAL DAILY
Refills: 5 | COMMUNITY
Start: 2019-07-10 | End: 2019-09-17

## 2019-08-30 RX ORDER — OMEGA-3-ACID ETHYL ESTERS 1 G/1
2 CAPSULE, LIQUID FILLED ORAL 2 TIMES DAILY
Qty: 120 CAPSULE | Refills: 6 | Status: SHIPPED | OUTPATIENT
Start: 2019-08-30 | End: 2019-10-22 | Stop reason: SDUPTHER

## 2019-08-30 RX ORDER — OMEGA-3-ACID ETHYL ESTERS 1 G/1
2 CAPSULE, LIQUID FILLED ORAL 2 TIMES DAILY
Qty: 120 CAPSULE | Refills: 6 | Status: SHIPPED | OUTPATIENT
Start: 2019-08-30 | End: 2019-08-30 | Stop reason: SDUPTHER

## 2019-08-30 RX ORDER — ESCITALOPRAM OXALATE 5 MG/1
5 TABLET ORAL DAILY
Qty: 30 TABLET | Refills: 6 | OUTPATIENT
Start: 2019-08-30 | End: 2020-08-29

## 2019-08-30 RX ORDER — ESCITALOPRAM OXALATE 5 MG/1
5 TABLET ORAL DAILY
Qty: 30 TABLET | Refills: 5 | OUTPATIENT
Start: 2019-08-30

## 2019-08-30 NOTE — TELEPHONE ENCOUNTER
Refill request for Escitalopram 5 mg tablets   Take 1 tablet by mouth every day  Date Written: 01/14/2019  Last filled: 7/10/2019

## 2019-08-30 NOTE — TELEPHONE ENCOUNTER
Refill request Omega-3-Acid 1 gm capsules  Take 2 capsules by mouth twice daily  Date Written: 01/15/2019  Last Filled: 08/01/2019

## 2019-08-30 NOTE — TELEPHONE ENCOUNTER
Refill request Bydureon 2 mg pen (once weekly) 4 pens  Inject 2 ml subcutaneously every week  Date Written: 01/14/2019  Last filled: 08/01/2019

## 2019-09-17 ENCOUNTER — OFFICE VISIT (OUTPATIENT)
Dept: INTERNAL MEDICINE | Facility: CLINIC | Age: 64
End: 2019-09-17
Payer: COMMERCIAL

## 2019-09-17 VITALS
WEIGHT: 219 LBS | DIASTOLIC BLOOD PRESSURE: 71 MMHG | SYSTOLIC BLOOD PRESSURE: 135 MMHG | HEIGHT: 64 IN | OXYGEN SATURATION: 97 % | BODY MASS INDEX: 37.39 KG/M2 | RESPIRATION RATE: 16 BRPM | TEMPERATURE: 98 F | HEART RATE: 72 BPM

## 2019-09-17 DIAGNOSIS — N18.6 TYPE 2 DIABETES MELLITUS WITH CHRONIC KIDNEY DISEASE ON CHRONIC DIALYSIS, WITHOUT LONG-TERM CURRENT USE OF INSULIN: Primary | ICD-10-CM

## 2019-09-17 DIAGNOSIS — Z01.818 PREOPERATIVE CLEARANCE: ICD-10-CM

## 2019-09-17 DIAGNOSIS — E11.22 TYPE 2 DIABETES MELLITUS WITH CHRONIC KIDNEY DISEASE ON CHRONIC DIALYSIS, WITHOUT LONG-TERM CURRENT USE OF INSULIN: Primary | ICD-10-CM

## 2019-09-17 DIAGNOSIS — M25.551 RIGHT HIP PAIN: ICD-10-CM

## 2019-09-17 DIAGNOSIS — Z99.2 TYPE 2 DIABETES MELLITUS WITH CHRONIC KIDNEY DISEASE ON CHRONIC DIALYSIS, WITHOUT LONG-TERM CURRENT USE OF INSULIN: Primary | ICD-10-CM

## 2019-09-17 PROCEDURE — 99214 OFFICE O/P EST MOD 30 MIN: CPT | Mod: S$GLB,,, | Performed by: NURSE PRACTITIONER

## 2019-09-17 PROCEDURE — 99214 PR OFFICE/OUTPT VISIT, EST, LEVL IV, 30-39 MIN: ICD-10-PCS | Mod: S$GLB,,, | Performed by: NURSE PRACTITIONER

## 2019-09-17 RX ORDER — LIDOCAINE AND PRILOCAINE 25; 25 MG/G; MG/G
CREAM TOPICAL
Refills: 0 | COMMUNITY
Start: 2019-07-31 | End: 2020-02-28 | Stop reason: SDUPTHER

## 2019-09-17 RX ORDER — HYDROCODONE BITARTRATE AND ACETAMINOPHEN 5; 325 MG/1; MG/1
1 TABLET ORAL EVERY 6 HOURS PRN
Qty: 20 TABLET | Refills: 0 | Status: SHIPPED | OUTPATIENT
Start: 2019-09-17 | End: 2019-09-24

## 2019-09-17 NOTE — PROGRESS NOTES
Subjective:      Patient ID: Tiffany Adams is a 63 y.o. female.    Chief Complaint: No chief complaint on file.    Pt here for surgical clearance:    Surgery scheduled for Monday 9/23/19  with Dr genevieve Up, Left BC AVF branch ligation, METS score 4.05 with DASI questionaire    Pt states constant deep pain over right hip- 7/10, improves minimally with tylenol and biofreeze, and some position changes improves the discomfort, states disrupting sleep every night- with multiple wakenings, states its worse in the mornings and evenings- onset 2 weeks ago, no known injury- no worsening pain with palpation     Pt states fasting sugars ranging from  since last visit    Review of Systems   Constitutional: Negative for chills, diaphoresis, fever, malaise/fatigue and weight loss.   HENT: Negative for congestion, ear pain, sinus pain, sore throat and tinnitus.    Eyes: Negative for blurred vision and photophobia.   Respiratory: Negative for cough, hemoptysis, shortness of breath and wheezing.    Cardiovascular: Negative for chest pain, palpitations, orthopnea, leg swelling and PND.   Gastrointestinal: Negative for abdominal pain, blood in stool, constipation, diarrhea, heartburn, melena, nausea and vomiting.   Genitourinary: Negative for dysuria, flank pain, frequency, hematuria and urgency.   Musculoskeletal: Positive for joint pain (right hip). Negative for back pain, myalgias and neck pain.   Skin: Negative for rash.   Neurological: Negative for dizziness, tremors, seizures, loss of consciousness and weakness.   Endo/Heme/Allergies: Negative for polydipsia.   Psychiatric/Behavioral: Negative for depression and hallucinations. The patient does not have insomnia.      Objective:     Physical Exam   Constitutional: She is oriented to person, place, and time. No distress.   HENT:   Head: Normocephalic.   Eyes: Right eye exhibits no discharge. Left eye exhibits no discharge.   Neck: No thyromegaly present.    Cardiovascular: Normal rate, regular rhythm and normal heart sounds.   No murmur heard.  Pulmonary/Chest: Effort normal. No respiratory distress. She has no wheezes. She has no rales.   Abdominal: Soft. Bowel sounds are normal. She exhibits no distension. There is no tenderness. There is no guarding.   No flank pain with blunt palpation bilaterally   Musculoskeletal: She exhibits no edema.   Neurological: She is alert and oriented to person, place, and time.   Skin: Skin is warm and dry. She is not diaphoretic.   Psychiatric: She has a normal mood and affect. Her behavior is normal. Judgment and thought content normal.     Assessment:     No diagnosis found.   Plan:     1. Type 2 diabetes mellitus with chronic kidney disease on chronic dialysis, without long-term current use of insulin  POCT Glucose, Hand-Held Device   2. Preoperative clearance

## 2019-09-23 ENCOUNTER — HISTORICAL (OUTPATIENT)
Dept: ADMINISTRATIVE | Facility: HOSPITAL | Age: 64
End: 2019-09-23

## 2019-09-23 LAB
ABS NEUT (OLG): 5.42 X10(3)/MCL (ref 2.1–9.2)
BASOPHILS # BLD AUTO: 0.1 X10(3)/MCL (ref 0–0.2)
BASOPHILS NFR BLD AUTO: 1 %
BUN SERPL-MCNC: 48 MG/DL (ref 7–18)
CALCIUM SERPL-MCNC: 8.8 MG/DL (ref 8.5–10.1)
CHLORIDE SERPL-SCNC: 104 MMOL/L (ref 98–107)
CO2 SERPL-SCNC: 31 MMOL/L (ref 21–32)
CREAT SERPL-MCNC: 4.34 MG/DL (ref 0.55–1.02)
CREAT/UREA NIT SERPL: 11.1
EOSINOPHIL # BLD AUTO: 0.3 X10(3)/MCL (ref 0–0.9)
EOSINOPHIL NFR BLD AUTO: 4 %
ERYTHROCYTE [DISTWIDTH] IN BLOOD BY AUTOMATED COUNT: 16.2 % (ref 11.5–17)
GLUCOSE SERPL-MCNC: 119 MG/DL (ref 74–106)
HCT VFR BLD AUTO: 37.5 % (ref 37–47)
HGB BLD-MCNC: 12 GM/DL (ref 12–16)
LYMPHOCYTES # BLD AUTO: 0.6 X10(3)/MCL (ref 0.6–4.6)
LYMPHOCYTES NFR BLD AUTO: 8 %
MCH RBC QN AUTO: 33.3 PG (ref 27–31)
MCHC RBC AUTO-ENTMCNC: 32 GM/DL (ref 33–36)
MCV RBC AUTO: 104.2 FL (ref 80–94)
MONOCYTES # BLD AUTO: 0.7 X10(3)/MCL (ref 0.1–1.3)
MONOCYTES NFR BLD AUTO: 10 %
NEUTROPHILS # BLD AUTO: 5.42 X10(3)/MCL (ref 2.1–9.2)
NEUTROPHILS NFR BLD AUTO: 76 %
PLATELET # BLD AUTO: 208 X10(3)/MCL (ref 130–400)
PMV BLD AUTO: 9.9 FL (ref 9.4–12.4)
POTASSIUM SERPL-SCNC: 4.4 MMOL/L (ref 3.5–5.1)
RBC # BLD AUTO: 3.6 X10(6)/MCL (ref 4.2–5.4)
SODIUM SERPL-SCNC: 138 MMOL/L (ref 136–145)
WBC # SPEC AUTO: 7.1 X10(3)/MCL (ref 4.5–11.5)

## 2019-10-22 DIAGNOSIS — E78.00 PURE HYPERCHOLESTEROLEMIA: ICD-10-CM

## 2019-10-22 DIAGNOSIS — M79.7 FIBROMYALGIA: ICD-10-CM

## 2019-10-23 RX ORDER — TIZANIDINE 4 MG/1
4 TABLET ORAL NIGHTLY
Qty: 90 TABLET | Refills: 3 | Status: SHIPPED | OUTPATIENT
Start: 2019-10-23 | End: 2020-02-28 | Stop reason: SDUPTHER

## 2019-10-23 RX ORDER — OMEGA-3-ACID ETHYL ESTERS 1 G/1
2 CAPSULE, LIQUID FILLED ORAL 2 TIMES DAILY
Qty: 360 CAPSULE | Refills: 3 | Status: SHIPPED | OUTPATIENT
Start: 2019-10-23 | End: 2019-10-31 | Stop reason: SDUPTHER

## 2019-10-31 DIAGNOSIS — E78.00 PURE HYPERCHOLESTEROLEMIA: ICD-10-CM

## 2019-10-31 RX ORDER — OMEGA-3-ACID ETHYL ESTERS 1 G/1
2 CAPSULE, LIQUID FILLED ORAL 2 TIMES DAILY
Qty: 360 CAPSULE | Refills: 3 | Status: SHIPPED | OUTPATIENT
Start: 2019-10-31 | End: 2020-03-16 | Stop reason: SDUPTHER

## 2019-11-21 DIAGNOSIS — Z12.31 ENCOUNTER FOR SCREENING MAMMOGRAM FOR BREAST CANCER: Primary | ICD-10-CM

## 2019-12-04 RX ORDER — GLIMEPIRIDE 4 MG/1
TABLET ORAL
OUTPATIENT
Start: 2019-12-04

## 2020-02-28 ENCOUNTER — OFFICE VISIT (OUTPATIENT)
Dept: INTERNAL MEDICINE | Facility: CLINIC | Age: 65
End: 2020-02-28
Payer: MEDICARE

## 2020-02-28 VITALS
TEMPERATURE: 98 F | HEART RATE: 64 BPM | OXYGEN SATURATION: 95 % | BODY MASS INDEX: 37.81 KG/M2 | HEIGHT: 63 IN | DIASTOLIC BLOOD PRESSURE: 67 MMHG | SYSTOLIC BLOOD PRESSURE: 107 MMHG | WEIGHT: 213.38 LBS

## 2020-02-28 DIAGNOSIS — L30.9 ECZEMA, UNSPECIFIED TYPE: ICD-10-CM

## 2020-02-28 DIAGNOSIS — Z79.4 TYPE 2 DIABETES MELLITUS WITH OTHER DIABETIC KIDNEY COMPLICATION, WITH LONG-TERM CURRENT USE OF INSULIN: Primary | ICD-10-CM

## 2020-02-28 DIAGNOSIS — Z11.59 NEED FOR HEPATITIS C SCREENING TEST: ICD-10-CM

## 2020-02-28 DIAGNOSIS — E11.29 TYPE 2 DIABETES MELLITUS WITH OTHER DIABETIC KIDNEY COMPLICATION, WITH LONG-TERM CURRENT USE OF INSULIN: Primary | ICD-10-CM

## 2020-02-28 PROBLEM — Z94.0 HISTORY OF KIDNEY TRANSPLANT: Status: ACTIVE | Noted: 2019-12-17

## 2020-02-28 LAB
ABS NRBC COUNT: 0 X 10 3/UL (ref 0–0.01)
ABSOLUTE BASOPHIL: 0.02 X 10 3/UL (ref 0–0.22)
ABSOLUTE EOSINOPHIL: 0.07 X 10 3/UL (ref 0.04–0.54)
ABSOLUTE LYMPHOCYTE: 0.39 X 10 3/UL (ref 0.86–4.75)
ABSOLUTE MONOCYTE: 0.12 X 10 3/UL (ref 0.22–1.08)
ALBUMIN SERPL-MCNC: 4.5 G/DL (ref 3.5–5.2)
ALBUMIN/GLOB SERPL ELPH: 1.6 {RATIO} (ref 1–2.7)
ALP ISOS SERPL LEV INH-CCNC: 168 U/L (ref 35–105)
ALT (SGPT): 27 U/L (ref 0–33)
ANION GAP SERPL CALC-SCNC: 13 MMOL/L (ref 8–17)
AST SERPL-CCNC: 27 U/L (ref 0–32)
BANDS: 12 % (ref 1–5)
BASOPHILS NFR BLD: 1 % (ref 0–1)
BILIRUBIN, TOTAL: 0.41 MG/DL (ref 0–1.2)
BUN/CREAT SERPL: 20.5 (ref 6–20)
CALCIUM SERPL-MCNC: 9.7 MG/DL (ref 8.6–10.2)
CARBON DIOXIDE, CO2: 23 MMOL/L (ref 22–29)
CHLORIDE: 100 MMOL/L (ref 98–107)
CHOLEST SERPL-MSCNC: 111 MG/DL (ref 100–200)
CREAT SERPL-MCNC: 1.06 MG/DL (ref 0.5–0.9)
CREATININE RANDOM URINE: 116.4 MG/DL (ref 28–217)
EOSINOPHIL NFR BLD: 3 % (ref 1–7)
ESTIMATED AVERAGE GLUCOSE: 168 MG/DL
GFR ESTIMATION: 51.86
GLOBULIN: 2.9 G/DL (ref 1.5–4.5)
GLUCOSE: 187 MG/DL (ref 82–115)
HBA1C MFR BLD: 7.5 % (ref 4–6)
HCT VFR BLD AUTO: 38.2 % (ref 37–47)
HCV IGG SERPL QL IA: NONREACTIVE
HDLC SERPL-MCNC: 41 MG/DL
HGB BLD-MCNC: 12.6 G/DL (ref 12–16)
LDL/HDL RATIO: 0.8 (ref 1–3)
LDLC SERPL CALC-MCNC: 31 MG/DL (ref 0–100)
LYMPHOCYTES NFR BLD: 15 % (ref 19–53)
MCH RBC QN AUTO: 32.4 PG (ref 27–32)
MCHC RBC AUTO-ENTMCNC: 33 G/DL (ref 32–36)
MCV RBC AUTO: 98.2 FL (ref 82–100)
METAMYELOCYTES # BLD MANUAL: 1 %
MICROALBUMIN QUANT: 2.8 MG/DL (ref 0–2)
MICROALBUMIN/CREATININE RATIO: 24.05 UG/MG (ref 0–30)
MONOCYTES NFR BLD: 5 % (ref 5–13)
NEUTROPHILS ABSOLUTE COUNT: 1.82 X 10 3/UL (ref 2.32–6.7)
NUCLEATED RED BLOOD CELLS: 0 /100 WBC (ref 0–0.2)
POTASSIUM: 5.1 MMOL/L (ref 3.5–5.1)
PROT SNV-MCNC: 7.4 G/DL (ref 6.4–8.3)
RBC # BLD AUTO: 3.89 X 10 6/UL (ref 4.2–5.4)
RBC & PLT MORPHOLOGY: ABNORMAL
RDW-SD: 45.7 FL (ref 37–54)
REACTIVE LYMPHOCYTES: 1 %
SEGMENTERS: 62 % (ref 34–71)
SODIUM: 136 MMOL/L (ref 136–145)
TOTAL GRANULOCYTES: 1.92 X 10 3/UL
TRIGL SERPL-MCNC: 195 MG/DL (ref 0–150)
TSH SERPL DL<=0.005 MIU/L-ACNC: 1.49 UIU/ML (ref 0.27–4.2)
UREA NITROGEN (BUN): 21.7 MG/DL (ref 8–23)
WBC # BLD: 2.46 X 10 3/UL (ref 4.3–10.8)

## 2020-02-28 PROCEDURE — 99214 PR OFFICE/OUTPT VISIT, EST, LEVL IV, 30-39 MIN: ICD-10-PCS | Mod: S$GLB,,, | Performed by: INTERNAL MEDICINE

## 2020-02-28 PROCEDURE — 99214 OFFICE O/P EST MOD 30 MIN: CPT | Mod: S$GLB,,, | Performed by: INTERNAL MEDICINE

## 2020-02-28 RX ORDER — VALGANCICLOVIR 450 MG/1
450 TABLET, FILM COATED ORAL DAILY
COMMUNITY
Start: 2020-02-24 | End: 2020-04-27 | Stop reason: SDUPTHER

## 2020-02-28 RX ORDER — SULFAMETHOXAZOLE AND TRIMETHOPRIM 800; 160 MG/1; MG/1
1 TABLET ORAL DAILY
COMMUNITY
Start: 2020-02-24 | End: 2020-08-24

## 2020-02-28 RX ORDER — B-COMPLEX WITH VITAMIN C
1 TABLET ORAL DAILY
COMMUNITY
Start: 2020-02-24

## 2020-02-28 RX ORDER — TACROLIMUS 1 MG/1
3 CAPSULE ORAL DAILY
COMMUNITY
Start: 2020-02-24

## 2020-02-28 RX ORDER — INSULIN GLARGINE 100 [IU]/ML
22 INJECTION, SOLUTION SUBCUTANEOUS NIGHTLY
COMMUNITY
Start: 2020-02-06 | End: 2020-05-06 | Stop reason: SDUPTHER

## 2020-02-28 RX ORDER — MYCOPHENOLIC ACID 360 MG/1
360 TABLET, DELAYED RELEASE ORAL 2 TIMES DAILY
COMMUNITY
Start: 2020-01-23 | End: 2021-10-04 | Stop reason: DRUGHIGH

## 2020-02-28 RX ORDER — TRIAMCINOLONE ACETONIDE 1 MG/G
OINTMENT TOPICAL 2 TIMES DAILY
Qty: 15 G | Refills: 0 | Status: SHIPPED | OUTPATIENT
Start: 2020-02-28 | End: 2022-08-23

## 2020-02-28 NOTE — PROGRESS NOTES
Subjective:      Patient ID: Tiffany Adams is a 64 y.o. female.    Chief Complaint: Diabetes    Patient with h/o ESRD s/p Relative donor Renal Transplant in dec/2019. Patient reports that she is doing well. Patient is on Immunosuppressants and is tolerating it well.     Patient reports Blood sugars are running high. Patient has been started on Basaglar 22 units and tradjenta. Patient reports BS are running F:190-240 and Non-fasting BS = 198-310.Patient reports polyuria, polydipsia, No numbness in hands or feet. Patient last foot exam and eye exam is uptodate.     Pateint BP are under good control.     Review of Systems   Constitutional: Negative for chills, diaphoresis, fever, malaise/fatigue and weight loss.   HENT: Negative for congestion, ear pain, sinus pain, sore throat and tinnitus.    Eyes: Negative for blurred vision and photophobia.   Respiratory: Negative for cough, hemoptysis, shortness of breath and wheezing.    Cardiovascular: Negative for chest pain, palpitations, orthopnea, leg swelling and PND.   Gastrointestinal: Negative for abdominal pain, blood in stool, constipation, diarrhea, heartburn, melena, nausea and vomiting.   Genitourinary: Negative for dysuria, flank pain, frequency, hematuria and urgency.   Musculoskeletal: Negative for back pain, joint pain, myalgias and neck pain.   Skin: Negative for rash.   Neurological: Negative for dizziness, tremors, seizures, loss of consciousness and weakness.   Endo/Heme/Allergies: Negative for polydipsia.   Psychiatric/Behavioral: Negative for depression and hallucinations. The patient does not have insomnia.      Objective:     Physical Exam   Constitutional: She is oriented to person, place, and time. No distress.   HENT:   Head: Normocephalic.   Eyes: Right eye exhibits no discharge. Left eye exhibits no discharge.   Neck: No thyromegaly present.   Cardiovascular: Normal rate, regular rhythm and normal heart sounds.   No murmur heard.  Pulmonary/Chest:  Effort normal. No respiratory distress. She has no wheezes. She has no rales.   Abdominal: Soft. Bowel sounds are normal. She exhibits no distension. There is no tenderness. There is no guarding.   RLQ healed surgical scar.     Musculoskeletal: She exhibits no edema.   Neurological: She is alert and oriented to person, place, and time.   Skin: Skin is warm and dry. She is not diaphoretic.   Patch of 5 cm, with ground glass appearance lesion on the left side of the chest posteriorly at about T3-5 level.    Psychiatric: She has a normal mood and affect. Her behavior is normal. Judgment and thought content normal.     Assessment:     1. Type 2 diabetes mellitus with other diabetic kidney complication, with long-term current use of insulin    2. Need for hepatitis C screening test    3. Eczema, unspecified type       Plan:   Patient blood sugars seems to be running high.  Will increase basal are to 26 units.   Advised patient to monitor blood sugar before and after meals for the dose can be adjusted  Will get Annual labs.   Patient has recent renal transplant.  Will be aggressive with blood sugar control  Will screen for hepatitis-C  Patient has patch of eczema.  Will use triamcinolone cream  Patient had DEXA scan in an outside facility.  Will try to get the results

## 2020-03-02 DIAGNOSIS — D72.819 LEUKOPENIA, UNSPECIFIED TYPE: Primary | ICD-10-CM

## 2020-03-02 NOTE — PROGRESS NOTES
Spoke to pt, she denies all of the following: abd pain, n/v/d, urinary freq, foul smelling urine, sob, wheezing, fever, instructed pt she needs cxry and u/a asap to rule out sources of infection also told her if any symptoms occur or change report to Er for work up, pt verb understanding

## 2020-03-16 ENCOUNTER — OFFICE VISIT (OUTPATIENT)
Dept: INTERNAL MEDICINE | Facility: CLINIC | Age: 65
End: 2020-03-16
Payer: MEDICARE

## 2020-03-16 VITALS
DIASTOLIC BLOOD PRESSURE: 70 MMHG | OXYGEN SATURATION: 97 % | HEART RATE: 64 BPM | HEIGHT: 63 IN | BODY MASS INDEX: 37.92 KG/M2 | SYSTOLIC BLOOD PRESSURE: 118 MMHG | WEIGHT: 214 LBS | TEMPERATURE: 97 F

## 2020-03-16 DIAGNOSIS — Z99.2 TYPE 2 DIABETES MELLITUS WITH CHRONIC KIDNEY DISEASE ON CHRONIC DIALYSIS, WITHOUT LONG-TERM CURRENT USE OF INSULIN: ICD-10-CM

## 2020-03-16 DIAGNOSIS — N18.6 TYPE 2 DIABETES MELLITUS WITH CHRONIC KIDNEY DISEASE ON CHRONIC DIALYSIS, WITHOUT LONG-TERM CURRENT USE OF INSULIN: ICD-10-CM

## 2020-03-16 DIAGNOSIS — Z79.4 TYPE 2 DIABETES MELLITUS WITH OTHER DIABETIC KIDNEY COMPLICATION, WITH LONG-TERM CURRENT USE OF INSULIN: Primary | ICD-10-CM

## 2020-03-16 DIAGNOSIS — E78.00 PURE HYPERCHOLESTEROLEMIA: ICD-10-CM

## 2020-03-16 DIAGNOSIS — E11.22 TYPE 2 DIABETES MELLITUS WITH CHRONIC KIDNEY DISEASE ON CHRONIC DIALYSIS, WITHOUT LONG-TERM CURRENT USE OF INSULIN: ICD-10-CM

## 2020-03-16 DIAGNOSIS — D72.819 LEUKOPENIA, UNSPECIFIED TYPE: ICD-10-CM

## 2020-03-16 DIAGNOSIS — E11.29 TYPE 2 DIABETES MELLITUS WITH OTHER DIABETIC KIDNEY COMPLICATION, WITH LONG-TERM CURRENT USE OF INSULIN: Primary | ICD-10-CM

## 2020-03-16 DIAGNOSIS — I10 ESSENTIAL HYPERTENSION: ICD-10-CM

## 2020-03-16 DIAGNOSIS — G47.00 INSOMNIA, UNSPECIFIED TYPE: ICD-10-CM

## 2020-03-16 PROBLEM — G00.9 ACUTE BACTERIAL MENINGITIS: Status: RESOLVED | Noted: 2019-06-25 | Resolved: 2020-03-16

## 2020-03-16 PROCEDURE — 99214 PR OFFICE/OUTPT VISIT, EST, LEVL IV, 30-39 MIN: ICD-10-PCS | Mod: S$GLB,,, | Performed by: INTERNAL MEDICINE

## 2020-03-16 PROCEDURE — 99214 OFFICE O/P EST MOD 30 MIN: CPT | Mod: S$GLB,,, | Performed by: INTERNAL MEDICINE

## 2020-03-16 RX ORDER — TRAZODONE HYDROCHLORIDE 50 MG/1
50 TABLET ORAL NIGHTLY
Qty: 30 TABLET | Refills: 11 | Status: SHIPPED | OUTPATIENT
Start: 2020-03-16 | End: 2020-07-24

## 2020-03-16 RX ORDER — OMEGA-3-ACID ETHYL ESTERS 1 G/1
2 CAPSULE, LIQUID FILLED ORAL 2 TIMES DAILY
Qty: 360 CAPSULE | Refills: 3 | Status: SHIPPED | OUTPATIENT
Start: 2020-03-16 | End: 2020-07-24

## 2020-03-16 NOTE — PROGRESS NOTES
Subjective:      Patient ID: Tiffany Adams is a 64 y.o. female.    Chief Complaint: Follow-up    Patient with h/o ESRD s/p Relative donor Renal Transplant in dec/2019. Patient reports that she is doing well. Patient is on Immunosuppressants and is tolerating it well.     Patient reports Blood sugars are running high. Patient has been started on Basaglar 26 units and tradjenta. Patient reports BS are running F:190-240 and Non-fasting BS = 170-350.Patient reports polyuria, polydipsia, No numbness in hands or feet. Patient last foot exam and eye exam is uptodate.     Pateint BP are under good control at home running between 113-143/68-90    Patient reports insomnia. Patient goes to bed at 10 and is not able to sleep will 1am. Patient wakes up at 8am. Patient is able to sleep between 1-8 am but does not feel rested and has vivid dreams. Does not watch TV in bed.  Patient denies naps during daytime.     Review of Systems   Constitutional: Negative for chills, diaphoresis, fever, malaise/fatigue and weight loss.   HENT: Negative for congestion, ear pain, sinus pain, sore throat and tinnitus.    Eyes: Negative for blurred vision and photophobia.   Respiratory: Negative for cough, hemoptysis, shortness of breath and wheezing.    Cardiovascular: Negative for chest pain, palpitations, orthopnea, leg swelling and PND.   Gastrointestinal: Negative for abdominal pain, blood in stool, constipation, diarrhea, heartburn, melena, nausea and vomiting.   Genitourinary: Negative for dysuria, flank pain, frequency, hematuria and urgency.   Musculoskeletal: Negative for back pain, joint pain, myalgias and neck pain.   Skin: Negative for rash.   Neurological: Negative for dizziness, tremors, seizures, loss of consciousness and weakness.   Endo/Heme/Allergies: Negative for polydipsia.   Psychiatric/Behavioral: Negative for depression and hallucinations. The patient does not have insomnia.      Objective:     Physical Exam    Constitutional: She is oriented to person, place, and time. No distress.   HENT:   Head: Normocephalic.   Eyes: Right eye exhibits no discharge. Left eye exhibits no discharge.   Neck: No thyromegaly present.   Cardiovascular: Normal rate, regular rhythm and normal heart sounds.   No murmur heard.  Pulmonary/Chest: Effort normal. No respiratory distress. She has no wheezes. She has no rales.   Abdominal: Soft. Bowel sounds are normal. She exhibits no distension. There is no tenderness. There is no guarding.   RLQ healed surgical scar.   Musculoskeletal: She exhibits no edema.   Neurological: She is alert and oriented to person, place, and time.   Skin: Skin is warm and dry. She is not diaphoretic.   Psychiatric: She has a normal mood and affect. Her behavior is normal. Judgment and thought content normal.     Assessment:     1. Type 2 diabetes mellitus with other diabetic kidney complication, with long-term current use of insulin    2. Essential hypertension    3. Leukopenia, unspecified type    4. Pure hypercholesterolemia    5. Type 2 diabetes mellitus with chronic kidney disease on chronic dialysis, without long-term current use of insulin    6. Insomnia, unspecified type       Plan:   Patient blood sugars seems to be running high.  Will increase basal are to 28 units.   Advised patient to monitor blood sugar before and after meals for the dose can be adjusted  Advised patient to adhere with medication.  Blood pressures are under okay control.  Will continue medicine.  Patient last labs suggested leukopenia + bandemia..  Patient had no infectious symptoms.  Patient immunosuppression can be responsible for leukopenia  Will repeat CBC and BMP.   BMP from outside facilities showed hyperkalemia.   Advised patient to avoid gathering + wash hands frequently keeping in mind COVID 19 + immunosupression.  Will use trazodone for sleep.  Should help with the dreams as well.   Advised patient about sleep hygiene and not to  spend more than 8 hr in bed.   Advised patient against reading book or watching TV while in bed

## 2020-04-27 RX ORDER — VALGANCICLOVIR 450 MG/1
450 TABLET, FILM COATED ORAL DAILY
Qty: 30 TABLET | Refills: 3 | Status: SHIPPED | OUTPATIENT
Start: 2020-04-27 | End: 2020-07-24

## 2020-04-27 NOTE — TELEPHONE ENCOUNTER
----- Message from Marily Noriega sent at 4/27/2020 10:38 AM CDT -----  Contact: Patient   Patient need refill on meds valGANciclovir (VALCYTE) 450 mg Tab. [phone]. Tks

## 2020-05-06 DIAGNOSIS — M79.7 FIBROMYALGIA: ICD-10-CM

## 2020-05-06 DIAGNOSIS — F32.89 OTHER DEPRESSION: ICD-10-CM

## 2020-05-06 RX ORDER — DULOXETIN HYDROCHLORIDE 60 MG/1
60 CAPSULE, DELAYED RELEASE ORAL DAILY
Qty: 30 CAPSULE | Refills: 11 | Status: SHIPPED | OUTPATIENT
Start: 2020-05-06 | End: 2020-05-12 | Stop reason: SDUPTHER

## 2020-05-06 RX ORDER — INSULIN GLARGINE 100 [IU]/ML
22 INJECTION, SOLUTION SUBCUTANEOUS NIGHTLY
Qty: 9 SYRINGE | Refills: 2 | Status: SHIPPED | OUTPATIENT
Start: 2020-05-06 | End: 2020-05-12 | Stop reason: SDUPTHER

## 2020-05-06 NOTE — TELEPHONE ENCOUNTER
----- Message from Janette Kamron sent at 5/6/2020  1:38 PM CDT -----  Contact: patient  Type:  RX Refill Request    Who Called: patient  Refill or New Rx:refill  RX Name and Strength: Basaglar 100 units  How is the patient currently taking it? (ex. 1XDay): 1xday  Is this a 30 day or 90 day RX:pt not sure  Preferred Pharmacy with phone number:  [id number] [address] & [address] [address]  Phone: [phone] Fax: [phone]  Local or Mail Order:local  Ordering Provider:David Salas  Would the patient rather a call back or a response via [facility]? Call back  Best Call Back Number:[phone]  Additional Information: n/a    Type:  RX Refill Request    Who Called: patient  Refill or New Rx:refill  RX Name and Strength: Duloxetine 60mg  How is the patient currently taking it? (ex. 1XDay):1xday  Is this a 30 day or 90 day RX: 30day  Preferred Pharmacy with phone number:  [id number] [address] & [address] [address]  Phone: [phone] Fax: [phone]  Local or Mail Order:local  Ordering Provider:David Salas  Would the patient rather a call back or a response via [facility]? Call back  Best Call Back Number:[phone]  Additional Information: n/a

## 2020-05-12 DIAGNOSIS — F32.89 OTHER DEPRESSION: ICD-10-CM

## 2020-05-12 DIAGNOSIS — M79.7 FIBROMYALGIA: ICD-10-CM

## 2020-05-12 RX ORDER — DULOXETIN HYDROCHLORIDE 60 MG/1
60 CAPSULE, DELAYED RELEASE ORAL DAILY
Qty: 30 CAPSULE | Refills: 11 | Status: SHIPPED | OUTPATIENT
Start: 2020-05-12 | End: 2021-06-02 | Stop reason: SDUPTHER

## 2020-05-12 RX ORDER — INSULIN GLARGINE 100 [IU]/ML
22 INJECTION, SOLUTION SUBCUTANEOUS NIGHTLY
Qty: 9 SYRINGE | Refills: 2 | Status: SHIPPED | OUTPATIENT
Start: 2020-05-12 | End: 2020-09-28 | Stop reason: SDUPTHER

## 2020-06-01 RX ORDER — CARVEDILOL 25 MG/1
25 TABLET ORAL 2 TIMES DAILY WITH MEALS
Qty: 60 TABLET | Refills: 0 | Status: SHIPPED | OUTPATIENT
Start: 2020-06-01 | End: 2020-07-01 | Stop reason: SDUPTHER

## 2020-06-01 RX ORDER — CARVEDILOL 25 MG/1
TABLET ORAL
COMMUNITY
Start: 2020-03-26 | End: 2020-06-01 | Stop reason: SDUPTHER

## 2020-07-02 RX ORDER — CARVEDILOL 25 MG/1
25 TABLET ORAL 2 TIMES DAILY WITH MEALS
Qty: 60 TABLET | Refills: 0 | Status: SHIPPED | OUTPATIENT
Start: 2020-07-02 | End: 2020-07-23 | Stop reason: SDUPTHER

## 2020-07-15 RX ORDER — PEN NEEDLE, DIABETIC 30 GX3/16"
NEEDLE, DISPOSABLE MISCELLANEOUS
COMMUNITY
End: 2020-07-15 | Stop reason: SDUPTHER

## 2020-07-16 RX ORDER — PEN NEEDLE, DIABETIC 30 GX3/16"
1 NEEDLE, DISPOSABLE MISCELLANEOUS DAILY
Qty: 100 EACH | Refills: 3 | Status: SHIPPED | OUTPATIENT
Start: 2020-07-16 | End: 2023-01-19 | Stop reason: SDUPTHER

## 2020-07-24 ENCOUNTER — OFFICE VISIT (OUTPATIENT)
Dept: INTERNAL MEDICINE | Facility: CLINIC | Age: 65
End: 2020-07-24
Payer: MEDICARE

## 2020-07-24 VITALS
HEIGHT: 63 IN | BODY MASS INDEX: 38.27 KG/M2 | HEART RATE: 69 BPM | OXYGEN SATURATION: 98 % | TEMPERATURE: 97 F | DIASTOLIC BLOOD PRESSURE: 67 MMHG | SYSTOLIC BLOOD PRESSURE: 143 MMHG | WEIGHT: 216 LBS

## 2020-07-24 DIAGNOSIS — I10 ESSENTIAL HYPERTENSION: ICD-10-CM

## 2020-07-24 DIAGNOSIS — D53.9 MACROCYTIC ANEMIA: Primary | ICD-10-CM

## 2020-07-24 DIAGNOSIS — Z79.4 TYPE 2 DIABETES MELLITUS WITH OTHER DIABETIC KIDNEY COMPLICATION, WITH LONG-TERM CURRENT USE OF INSULIN: Primary | ICD-10-CM

## 2020-07-24 DIAGNOSIS — D72.819 LEUKOPENIA, UNSPECIFIED TYPE: ICD-10-CM

## 2020-07-24 DIAGNOSIS — E11.29 TYPE 2 DIABETES MELLITUS WITH OTHER DIABETIC KIDNEY COMPLICATION, WITH LONG-TERM CURRENT USE OF INSULIN: Primary | ICD-10-CM

## 2020-07-24 LAB
ABS NRBC COUNT: 0 X 10 3/UL (ref 0–0.01)
ABSOLUTE BASOPHIL: 0.06 X 10 3/UL (ref 0–0.22)
ABSOLUTE EOSINOPHIL: 0.32 X 10 3/UL (ref 0.04–0.54)
ABSOLUTE IMMATURE GRAN: 0.12 X 10 3/UL (ref 0–0.04)
ABSOLUTE LYMPHOCYTE: 0.38 X 10 3/UL (ref 0.86–4.75)
ABSOLUTE MONOCYTE: 0.75 X 10 3/UL (ref 0.22–1.08)
ANION GAP SERPL CALC-SCNC: 9 MMOL/L (ref 8–17)
BASOPHILS NFR BLD: 0.6 % (ref 0.2–1.2)
BUN/CREAT SERPL: 17.5 (ref 6–20)
CALCIUM SERPL-MCNC: 9.5 MG/DL (ref 8.6–10.2)
CARBON DIOXIDE, CO2: 28 MMOL/L (ref 22–29)
CHLORIDE: 99 MMOL/L (ref 98–107)
CREAT SERPL-MCNC: 1.18 MG/DL (ref 0.5–0.9)
EOSINOPHIL NFR BLD: 3.4 % (ref 0.7–7)
ESTIMATED AVERAGE GLUCOSE: 176 MG/DL
GFR ESTIMATION: 45.83
GLUCOSE: 171 MG/DL (ref 82–115)
HBA1C MFR BLD: 7.8 % (ref 4–6)
HCT VFR BLD AUTO: 40.2 % (ref 37–47)
HGB BLD-MCNC: 13 G/DL (ref 12–16)
IMMATURE GRANULOCYTES: 1.3 % (ref 0–0.5)
LYMPHOCYTES NFR BLD: 4.1 % (ref 19.3–53.1)
MCH RBC QN AUTO: 33.7 PG (ref 27–32)
MCHC RBC AUTO-ENTMCNC: 32.3 G/DL (ref 32–36)
MCV RBC AUTO: 104.1 FL (ref 82–100)
MONOCYTES NFR BLD: 8.1 % (ref 4.7–12.5)
NEUTROPHILS ABSOLUTE COUNT: 7.65 X 10 3/UL (ref 2.15–7.56)
NEUTROPHILS NFR BLD: 82.5 %
NUCLEATED RED BLOOD CELLS: 0 /100 WBC (ref 0–0.2)
PLATELET # BLD AUTO: 265 X 10 3/UL (ref 135–400)
POTASSIUM: 4.7 MMOL/L (ref 3.5–5.1)
RBC # BLD AUTO: 3.86 X 10 6/UL (ref 4.2–5.4)
RDW-SD: 56.9 FL (ref 37–54)
SODIUM: 136 MMOL/L (ref 136–145)
UREA NITROGEN (BUN): 20.6 MG/DL (ref 8–23)
WBC # BLD: 9.28 X 10 3/UL (ref 4.3–10.8)

## 2020-07-24 PROCEDURE — 99214 PR OFFICE/OUTPT VISIT, EST, LEVL IV, 30-39 MIN: ICD-10-PCS | Mod: S$GLB,,, | Performed by: INTERNAL MEDICINE

## 2020-07-24 PROCEDURE — 99214 OFFICE O/P EST MOD 30 MIN: CPT | Mod: S$GLB,,, | Performed by: INTERNAL MEDICINE

## 2020-07-24 RX ORDER — DULAGLUTIDE 0.75 MG/.5ML
0.75 INJECTION, SOLUTION SUBCUTANEOUS WEEKLY
Qty: 4 PEN | Refills: 2 | Status: SHIPPED | OUTPATIENT
Start: 2020-07-24 | End: 2020-11-11 | Stop reason: SDUPTHER

## 2020-07-24 RX ORDER — CARVEDILOL 25 MG/1
25 TABLET ORAL 2 TIMES DAILY WITH MEALS
Qty: 180 TABLET | Refills: 3 | Status: SHIPPED | OUTPATIENT
Start: 2020-07-24 | End: 2021-04-21

## 2020-07-24 NOTE — PROGRESS NOTES
Subjective:      Patient ID: Tiffany Adams is a 64 y.o. female.    Chief Complaint: Follow-up and Diabetes    HPI: Patient with h/o ESRD s/p Relative donor Renal Transplant in dec/2019. Patient reports that she is doing well. Patient is on Immunosuppressants and is tolerating it well.     Patient reports Blood sugars are running high. Patient has been started on Basaglar 28 units and tradjenta. Patient reports BS are running F:160-240 and Non-fasting BS = 160-320.Patient reports polyuria, polydipsia, No numbness in hands or feet.  Patient is not adherent to diet.    Pateint BP are under good control at home running between 106-143/74-90    Patient reports insomnia is better controlled both still has some vivid dreams.  Patient has stop trazodone    Review of Systems   Constitutional: Negative for chills, diaphoresis, fever, malaise/fatigue and weight loss (2 lb weight gain).   HENT: Negative for congestion, ear pain, sinus pain, sore throat and tinnitus.    Eyes: Negative for blurred vision and photophobia.   Respiratory: Negative for cough, hemoptysis, shortness of breath and wheezing.    Cardiovascular: Negative for chest pain, palpitations, orthopnea, leg swelling and PND.   Gastrointestinal: Negative for abdominal pain, blood in stool, constipation, diarrhea, heartburn, melena, nausea and vomiting.   Genitourinary: Negative for dysuria, frequency and urgency.   Musculoskeletal: Negative for back pain, myalgias and neck pain.   Skin: Negative for rash.   Neurological: Negative for dizziness, tremors, seizures, loss of consciousness and weakness.   Endo/Heme/Allergies: Negative for polydipsia.   Psychiatric/Behavioral: Negative for depression and hallucinations. The patient does not have insomnia.      Objective:     Physical Exam  Constitutional:       General: She is not in acute distress.     Appearance: She is not diaphoretic.   Neck:      Thyroid: No thyromegaly.   Cardiovascular:      Rate and Rhythm:  Normal rate and regular rhythm.      Heart sounds: Normal heart sounds. No murmur.   Pulmonary:      Effort: Pulmonary effort is normal. No respiratory distress.      Breath sounds: Normal breath sounds. No wheezing.   Chest:      Chest wall: No tenderness.   Abdominal:      General: Bowel sounds are normal. There is no distension.      Palpations: Abdomen is soft.      Tenderness: There is no abdominal tenderness.   Musculoskeletal:         General: No tenderness.   Lymphadenopathy:      Cervical: No cervical adenopathy.   Neurological:      Mental Status: She is alert and oriented to person, place, and time.      Cranial Nerves: No cranial nerve deficit.      Sensory: No sensory deficit.   Psychiatric:         Behavior: Behavior normal.         Thought Content: Thought content normal.         Judgment: Judgment normal.       Assessment:       ICD-10-CM ICD-9-CM   1. Type 2 diabetes mellitus with other diabetic kidney complication, with long-term current use of insulin  E11.29 250.40    Z79.4 V58.67   2. Essential hypertension  I10 401.9   3. Leukopenia, unspecified type  D72.819 288.50       Plan:     Patient blood sugars are not under control.  Will increase Basaglar to 30 units  Patient post pharyngeal blood sugars are running high will add Trulicity  Patient denies family history of pancreatitis or thyroid cancer  Advised patient to adhere with diet.  Will check A1c and CMP  Home blood pressures are under good control.  Will continue same medication  Last labs suggested leukopenia.  Repeat CBC is ordered.  Can be secondary to immunosuppressants.    Medication List with Changes/Refills   New Medications    DULAGLUTIDE (TRULICITY) 0.75 MG/0.5 ML PEN INJECTOR    Inject 0.75 mg into the skin once a week.   Current Medications    ASPIRIN (ECOTRIN) 81 MG EC TABLET    aspirin 81 mg tablet,delayed release   Take 1 tablet every day by oral route.    BASAGLAR KWIKPEN U-100 INSULIN GLARGINE 100 UNITS/ML (3ML) SUBQ PEN     "Inject 22 Units into the skin nightly.    BLOOD SUGAR DIAGNOSTIC STRP    Use to check blood glucose 3 times daily    DULOXETINE (CYMBALTA) 60 MG CAPSULE    Take 1 capsule (60 mg total) by mouth once daily.    FISH OIL/BORAGE/FLAX/OM3,6,9 1 (OMEGA 3-6-9 COMPLEX ORAL)    Take 1 capsule by mouth once daily.    LANCETS (ONETOUCH DELICA LANCETS) 33 GAUGE [facility]    Use as directed to check blood glucose 3 times daily    LINAGLIPTIN (TRADJENTA) 5 MG TAB TABLET    Take 1 tablet (5 mg total) by mouth once daily.    MYCOPHENOLATE (MYFORTIC) 360 MG TBEC    Take 360 mg by mouth 2 (two) times daily.    PEN NEEDLE, DIABETIC (BD ULTRA-FINE MINI PEN NEEDLE) 31 GAUGE X 3/16" NDLE    1 strip by Misc.(Non-Drug; Combo Route) route once daily.    PRENATAL VITAMIN 27 MG IRON- 0.8 MG TAB    Take 1 tablet by mouth once daily.    ROSUVASTATIN (CRESTOR) 10 MG TABLET    Take 1 tablet (10 mg total) by mouth once daily.    SULFAMETHOXAZOLE-TRIMETHOPRIM 800-160MG (BACTRIM DS) 800-160 MG TAB    Take 1 tablet by mouth once daily.    TACROLIMUS (PROGRAF) 1 MG CAP    Take 3 capsules by mouth once daily. Then 2 caps nightly    TRIAMCINOLONE ACETONIDE 0.1% (KENALOG) 0.1 % OINTMENT    Apply topically 2 (two) times daily. for 10 days   Changed and/or Refilled Medications    Modified Medication Previous Medication    CARVEDILOL (COREG) 25 MG TABLET carvediloL (COREG) 25 MG tablet       Take 1 tablet (25 mg total) by mouth 2 (two) times daily with meals.    Take 1 tablet (25 mg total) by mouth 2 (two) times daily with meals.   Discontinued Medications    OMEGA-3 ACID ETHYL ESTERS (LOVAZA) 1 GRAM CAPSULE    Take 2 capsules (2 g total) by mouth 2 (two) times daily.    TRAZODONE (DESYREL) 50 MG TABLET    Take 1 tablet (50 mg total) by mouth every evening.    VALGANCICLOVIR (VALCYTE) 450 MG TAB    Take 1 tablet (450 mg total) by mouth once daily.        "

## 2020-08-24 ENCOUNTER — OFFICE VISIT (OUTPATIENT)
Dept: INTERNAL MEDICINE | Facility: CLINIC | Age: 65
End: 2020-08-24
Payer: MEDICARE

## 2020-08-24 VITALS
DIASTOLIC BLOOD PRESSURE: 68 MMHG | WEIGHT: 214 LBS | OXYGEN SATURATION: 97 % | HEIGHT: 63 IN | BODY MASS INDEX: 37.92 KG/M2 | HEART RATE: 61 BPM | SYSTOLIC BLOOD PRESSURE: 127 MMHG | TEMPERATURE: 98 F

## 2020-08-24 DIAGNOSIS — E11.29 TYPE 2 DIABETES MELLITUS WITH OTHER DIABETIC KIDNEY COMPLICATION, WITH LONG-TERM CURRENT USE OF INSULIN: Primary | ICD-10-CM

## 2020-08-24 DIAGNOSIS — Z23 NEED FOR STREPTOCOCCUS PNEUMONIAE VACCINATION: ICD-10-CM

## 2020-08-24 DIAGNOSIS — I10 ESSENTIAL HYPERTENSION: ICD-10-CM

## 2020-08-24 DIAGNOSIS — Z79.4 TYPE 2 DIABETES MELLITUS WITH OTHER DIABETIC KIDNEY COMPLICATION, WITH LONG-TERM CURRENT USE OF INSULIN: Primary | ICD-10-CM

## 2020-08-24 PROCEDURE — G0009 ADMIN PNEUMOCOCCAL VACCINE: HCPCS | Mod: S$GLB,,, | Performed by: INTERNAL MEDICINE

## 2020-08-24 PROCEDURE — 90670 PCV13 VACCINE IM: CPT | Mod: S$GLB,,, | Performed by: INTERNAL MEDICINE

## 2020-08-24 PROCEDURE — 99214 OFFICE O/P EST MOD 30 MIN: CPT | Mod: 25,S$GLB,, | Performed by: INTERNAL MEDICINE

## 2020-08-24 PROCEDURE — 90670 PNEUMOCOCCAL CONJUGATE VACCINE 13-VALENT LESS THAN 5YO & GREATER THAN: ICD-10-PCS | Mod: S$GLB,,, | Performed by: INTERNAL MEDICINE

## 2020-08-24 PROCEDURE — 99214 PR OFFICE/OUTPT VISIT, EST, LEVL IV, 30-39 MIN: ICD-10-PCS | Mod: 25,S$GLB,, | Performed by: INTERNAL MEDICINE

## 2020-08-24 PROCEDURE — G0009 PNEUMOCOCCAL CONJUGATE VACCINE 13-VALENT LESS THAN 5YO & GREATER THAN: ICD-10-PCS | Mod: S$GLB,,, | Performed by: INTERNAL MEDICINE

## 2020-08-24 NOTE — PROGRESS NOTES
Subjective:      Patient ID: Tiffany Adams is a 64 y.o. female.    Chief Complaint: Follow-up     Patient with h/o ESRD s/p Relative donor Renal Transplant in dec/2019. Patient reports that she is doing well. Patient is on Immunosuppressants and is tolerating it well.     Patient reports Blood sugars are running high. Patient has been started on Basaglar 30 units and tradjenta. Patient reports BS are running F:137--240 and Non-fasting BS = 160-260.Patient reports polyuria, polydipsia, No numbness in hands or feet.  Patient is not adherent to diet.    Pateint BP are under good control at home running between 106-143/74-90      Review of Systems   Constitutional: Positive for weight loss (2 lb ). Negative for chills, diaphoresis, fever and malaise/fatigue.   HENT: Negative for congestion, ear pain, sinus pain, sore throat and tinnitus.    Eyes: Negative for blurred vision and photophobia.   Respiratory: Negative for cough, hemoptysis, shortness of breath and wheezing.    Cardiovascular: Negative for chest pain, palpitations, orthopnea, leg swelling and PND.   Gastrointestinal: Negative for abdominal pain, blood in stool, constipation, diarrhea, heartburn, melena, nausea and vomiting.   Genitourinary: Negative for dysuria, frequency and urgency.   Musculoskeletal: Negative for back pain, myalgias and neck pain.   Skin: Negative for rash.   Neurological: Negative for dizziness, tremors, seizures, loss of consciousness and weakness.   Endo/Heme/Allergies: Negative for polydipsia.   Psychiatric/Behavioral: Negative for depression and hallucinations. The patient does not have insomnia.      Objective:     Physical Exam  Constitutional:       General: She is not in acute distress.     Appearance: She is not diaphoretic.   Neck:      Thyroid: No thyromegaly.   Cardiovascular:      Rate and Rhythm: Normal rate and regular rhythm.      Heart sounds: Normal heart sounds. No murmur.   Pulmonary:      Effort: Pulmonary effort  is normal. No respiratory distress.      Breath sounds: Normal breath sounds. No wheezing.   Chest:      Chest wall: No tenderness.   Abdominal:      General: Bowel sounds are normal. There is no distension.      Palpations: Abdomen is soft.      Tenderness: There is no abdominal tenderness.   Musculoskeletal:         General: No tenderness.   Lymphadenopathy:      Cervical: No cervical adenopathy.   Neurological:      Mental Status: She is alert and oriented to person, place, and time.      Cranial Nerves: No cranial nerve deficit.      Sensory: No sensory deficit.   Psychiatric:         Behavior: Behavior normal.         Thought Content: Thought content normal.         Judgment: Judgment normal.       Assessment:       ICD-10-CM ICD-9-CM   1. Type 2 diabetes mellitus with other diabetic kidney complication, with long-term current use of insulin  E11.29 250.40    Z79.4 V58.67   2. Need for Streptococcus pneumoniae vaccination  Z23 V03.82   3. Essential hypertension  I10 401.9       Plan:     Patient blood sugars are not under control.  Will increase Basaglar to 34 units  Patient BS fasing and Post-Parandial BS are both running high.   Advised patient to continue to monitor BS at home  Will continue same dose of HTN medications.   Will administer Penumonia vaccine.        Medication List with Changes/Refills   Current Medications    ASPIRIN (ECOTRIN) 81 MG EC TABLET    aspirin 81 mg tablet,delayed release   Take 1 tablet every day by oral route.    BASAGLAR KWIKPEN U-100 INSULIN GLARGINE 100 UNITS/ML (3ML) SUBQ PEN    Inject 22 Units into the skin nightly.    BLOOD SUGAR DIAGNOSTIC STRP    Use to check blood glucose 3 times daily    CARVEDILOL (COREG) 25 MG TABLET    Take 1 tablet (25 mg total) by mouth 2 (two) times daily with meals.    DULAGLUTIDE (TRULICITY) 0.75 MG/0.5 ML PEN INJECTOR    Inject 0.75 mg into the skin once a week.    DULOXETINE (CYMBALTA) 60 MG CAPSULE    Take 1 capsule (60 mg total) by mouth once  "daily.    FISH OIL/BORAGE/FLAX/OM3,6,9 1 (OMEGA 3-6-9 COMPLEX ORAL)    Take 1 capsule by mouth once daily.    LANCETS (ONETOUCH DELICA LANCETS) 33 GAUGE [facility]    Use as directed to check blood glucose 3 times daily    LINAGLIPTIN (TRADJENTA) 5 MG TAB TABLET    Take 1 tablet (5 mg total) by mouth once daily.    MYCOPHENOLATE (MYFORTIC) 360 MG TBEC    Take 360 mg by mouth 2 (two) times daily.    PEN NEEDLE, DIABETIC (BD ULTRA-FINE MINI PEN NEEDLE) 31 GAUGE X 3/16" NDLE    1 strip by Misc.(Non-Drug; Combo Route) route once daily.    PRENATAL VITAMIN 27 MG IRON- 0.8 MG TAB    Take 1 tablet by mouth once daily.    ROSUVASTATIN (CRESTOR) 10 MG TABLET    Take 1 tablet (10 mg total) by mouth once daily.    TACROLIMUS (PROGRAF) 1 MG CAP    Take 3 capsules by mouth once daily. Then 2 caps nightly    TRIAMCINOLONE ACETONIDE 0.1% (KENALOG) 0.1 % OINTMENT    Apply topically 2 (two) times daily. for 10 days   Discontinued Medications    SULFAMETHOXAZOLE-TRIMETHOPRIM 800-160MG (BACTRIM DS) 800-160 MG TAB    Take 1 tablet by mouth once daily.          "

## 2020-09-24 ENCOUNTER — TELEPHONE (OUTPATIENT)
Dept: INTERNAL MEDICINE | Facility: CLINIC | Age: 65
End: 2020-09-24

## 2020-09-24 NOTE — TELEPHONE ENCOUNTER
----- Message from Piper Barber MA sent at 9/24/2020  2:13 PM CDT -----  Pt is coming in on Monday, 09/28 at 8:45, she is wanting to know if her  can come with her for a hospital follow up.

## 2020-09-28 ENCOUNTER — OFFICE VISIT (OUTPATIENT)
Dept: PRIMARY CARE CLINIC | Facility: CLINIC | Age: 65
End: 2020-09-28
Payer: MEDICARE

## 2020-09-28 VITALS
DIASTOLIC BLOOD PRESSURE: 70 MMHG | WEIGHT: 209 LBS | HEIGHT: 63 IN | BODY MASS INDEX: 37.03 KG/M2 | HEART RATE: 71 BPM | OXYGEN SATURATION: 98 % | SYSTOLIC BLOOD PRESSURE: 122 MMHG | TEMPERATURE: 98 F

## 2020-09-28 DIAGNOSIS — E11.42 DIABETIC POLYNEUROPATHY ASSOCIATED WITH TYPE 2 DIABETES MELLITUS: Primary | ICD-10-CM

## 2020-09-28 DIAGNOSIS — R09.89 ABSENT PEDAL PULSES: ICD-10-CM

## 2020-09-28 DIAGNOSIS — R25.2 MUSCLE CRAMPS: ICD-10-CM

## 2020-09-28 PROBLEM — I24.9 ACUTE CORONARY SYNDROME: Status: RESOLVED | Noted: 2019-06-25 | Resolved: 2020-09-28

## 2020-09-28 PROCEDURE — 99214 OFFICE O/P EST MOD 30 MIN: CPT | Mod: S$GLB,,, | Performed by: INTERNAL MEDICINE

## 2020-09-28 PROCEDURE — 99214 PR OFFICE/OUTPT VISIT, EST, LEVL IV, 30-39 MIN: ICD-10-PCS | Mod: S$GLB,,, | Performed by: INTERNAL MEDICINE

## 2020-09-28 RX ORDER — INSULIN GLARGINE 100 [IU]/ML
36 INJECTION, SOLUTION SUBCUTANEOUS NIGHTLY
Qty: 12 SYRINGE | Refills: 2 | Status: SHIPPED | OUTPATIENT
Start: 2020-09-28 | End: 2020-12-16 | Stop reason: SDUPTHER

## 2020-09-28 RX ORDER — TIZANIDINE 4 MG/1
4 TABLET ORAL EVERY 6 HOURS PRN
Qty: 30 TABLET | Refills: 0 | Status: SHIPPED | OUTPATIENT
Start: 2020-09-28 | End: 2020-10-08

## 2020-09-28 NOTE — PROGRESS NOTES
Subjective:      Patient ID: Tiffany Adams is a 64 y.o. female.    Chief Complaint: Follow-up     Patient with h/o ESRD s/p Relative donor Renal Transplant in dec/2019. Patient reports that she is doing well. Patient is on Immunosuppressants ( tacrolimus)  and is tolerating it well.     Patient reports Blood sugars are running high. Patient has been started on Basaglar 30 units and tradjenta + Trulicity. Patient reports BS are running F:100--220 and Non-fasting BS = 160-250.Patient reports polyuria, polydipsia, No numbness in hands or feet.  Patient is not adherent to diet.    Pateint BP are under good control at home running between 106-143/74-90.    Patient today complains of bilateral calf soreness x 2 months.  Patient reports the pain in the calfs are constant, mild-to-moderate in intensity feels squeezing/cramping in nature.  No exacerbating or relieving factors known, no exacerbation with activity.  Patient has history of fibromyalgia that is controlled with Cymbalta      Review of Systems   Constitutional: Positive for weight loss (5 lb ). Negative for chills, diaphoresis, fever and malaise/fatigue.   HENT: Negative for congestion, ear pain, sinus pain, sore throat and tinnitus.    Eyes: Negative for blurred vision and photophobia.   Respiratory: Negative for cough, hemoptysis, shortness of breath and wheezing.    Cardiovascular: Negative for chest pain, palpitations, orthopnea, leg swelling and PND.   Gastrointestinal: Negative for abdominal pain, blood in stool, constipation, diarrhea, heartburn, melena, nausea and vomiting.   Genitourinary: Negative for dysuria, frequency and urgency.   Musculoskeletal: Negative for back pain, myalgias and neck pain.   Skin: Negative for rash.   Neurological: Negative for dizziness, tremors, seizures, loss of consciousness and weakness.        Muscle pains   Endo/Heme/Allergies: Negative for polydipsia.   Psychiatric/Behavioral: Negative for depression and  hallucinations. The patient does not have insomnia.      Objective:     Physical Exam  Constitutional:       General: She is not in acute distress.     Appearance: She is not diaphoretic.   Neck:      Thyroid: No thyromegaly.   Cardiovascular:      Rate and Rhythm: Normal rate and regular rhythm.      Heart sounds: Normal heart sounds. No murmur.      Comments: Posterior tibial pulse palpable in right foot.  Dorsalis pedis and right foot is absent  Posterior tibial and dorsalis pedis pulses in left foot are absent  Pulmonary:      Effort: Pulmonary effort is normal. No respiratory distress.      Breath sounds: Normal breath sounds. No wheezing.   Chest:      Chest wall: No tenderness.   Abdominal:      General: Bowel sounds are normal. There is no distension.      Palpations: Abdomen is soft.      Tenderness: There is no abdominal tenderness.   Musculoskeletal:         General: No tenderness.   Lymphadenopathy:      Cervical: No cervical adenopathy.   Neurological:      Mental Status: She is alert and oriented to person, place, and time.      Cranial Nerves: No cranial nerve deficit.      Sensory: No sensory deficit.   Psychiatric:         Behavior: Behavior normal.         Thought Content: Thought content normal.         Judgment: Judgment normal.       Assessment:       ICD-10-CM ICD-9-CM   1. Diabetic polyneuropathy associated with type 2 diabetes mellitus  E11.42 250.60     357.2   2. Absent pedal pulses  R09.89 785.9   3. Muscle cramps  R25.2 729.82       Plan:     Patient blood sugars are not under control.  Will increase Basaglar to 36 units  Patient BS fasing and Post-Parandial BS are both running high.   Advised patient to continue to monitor BS at home  Will continue same dose of HTN medications.   Patient has bilateral muscle cramps in calf area.  Will start on tizanidine  Patient has absent pedal pulses Will order Arterial ultrasound bilateral legs and TUCKER       Medication List with Changes/Refills  "  Current Medications    ASPIRIN (ECOTRIN) 81 MG EC TABLET    aspirin 81 mg tablet,delayed release   Take 1 tablet every day by oral route.    BASAGLAR KWIKPEN U-100 INSULIN GLARGINE 100 UNITS/ML (3ML) SUBQ PEN    Inject 22 Units into the skin nightly.    BLOOD SUGAR DIAGNOSTIC STRP    Use to check blood glucose 3 times daily    CARVEDILOL (COREG) 25 MG TABLET    Take 1 tablet (25 mg total) by mouth 2 (two) times daily with meals.    DULAGLUTIDE (TRULICITY) 0.75 MG/0.5 ML PEN INJECTOR    Inject 0.75 mg into the skin once a week.    DULOXETINE (CYMBALTA) 60 MG CAPSULE    Take 1 capsule (60 mg total) by mouth once daily.    FISH OIL/BORAGE/FLAX/OM3,6,9 1 (OMEGA 3-6-9 COMPLEX ORAL)    Take 1 capsule by mouth once daily.    LANCETS (ONETOUCH DELICA LANCETS) 33 GAUGE MISC    Use as directed to check blood glucose 3 times daily    LINAGLIPTIN (TRADJENTA) 5 MG TAB TABLET    Take 1 tablet (5 mg total) by mouth once daily.    MYCOPHENOLATE (MYFORTIC) 360 MG TBEC    Take 360 mg by mouth 2 (two) times daily.    PEN NEEDLE, DIABETIC (BD ULTRA-FINE MINI PEN NEEDLE) 31 GAUGE X 3/16" NDLE    1 strip by Misc.(Non-Drug; Combo Route) route once daily.    PRENATAL VITAMIN 27 MG IRON- 0.8 MG TAB    Take 1 tablet by mouth once daily.    ROSUVASTATIN (CRESTOR) 10 MG TABLET    Take 1 tablet (10 mg total) by mouth once daily.    TACROLIMUS (PROGRAF) 1 MG CAP    Take 3 capsules by mouth once daily. Then 2 caps nightly    TRIAMCINOLONE ACETONIDE 0.1% (KENALOG) 0.1 % OINTMENT    Apply topically 2 (two) times daily. for 10 days            "

## 2020-10-16 LAB
LEFT EYE DM RETINOPATHY: NEGATIVE
RIGHT EYE DM RETINOPATHY: NEGATIVE

## 2020-10-26 ENCOUNTER — OFFICE VISIT (OUTPATIENT)
Dept: PRIMARY CARE CLINIC | Facility: CLINIC | Age: 65
End: 2020-10-26
Payer: MEDICARE

## 2020-10-26 VITALS
WEIGHT: 207 LBS | DIASTOLIC BLOOD PRESSURE: 62 MMHG | TEMPERATURE: 98 F | HEART RATE: 70 BPM | HEIGHT: 63 IN | BODY MASS INDEX: 36.68 KG/M2 | SYSTOLIC BLOOD PRESSURE: 102 MMHG | RESPIRATION RATE: 16 BRPM | OXYGEN SATURATION: 99 %

## 2020-10-26 DIAGNOSIS — Z94.0 HISTORY OF KIDNEY TRANSPLANT: ICD-10-CM

## 2020-10-26 DIAGNOSIS — R25.2 MUSCLE CRAMPS: ICD-10-CM

## 2020-10-26 DIAGNOSIS — E11.42 DIABETIC POLYNEUROPATHY ASSOCIATED WITH TYPE 2 DIABETES MELLITUS: Primary | ICD-10-CM

## 2020-10-26 LAB
ALBUMIN SERPL BCP-MCNC: 4.2 G/DL (ref 3.4–5)
ALBUMIN/GLOBULIN RATIO: 1.31 RATIO (ref 1.1–1.8)
ALP SERPL-CCNC: 190 U/L (ref 46–116)
ALT SERPL W P-5'-P-CCNC: 39 U/L (ref 12–78)
ANION GAP SERPL CALC-SCNC: 9 MMOL/L (ref 3–11)
AST SERPL-CCNC: 28 U/L (ref 15–37)
BANDS: 3 % (ref 0–5)
BILIRUB SERPL-MCNC: 1 MG/DL (ref 0–1)
BUN SERPL-MCNC: 18 MG/DL (ref 7–18)
BUN/CREAT SERPL: 14.06 RATIO (ref 7–18)
CALCIUM SERPL-MCNC: 9.4 MG/DL (ref 8.8–10.5)
CELLS COUNTED: 100
CHLORIDE SERPL-SCNC: 101 MMOL/L (ref 100–108)
CO2 SERPL-SCNC: 28 MMOL/L (ref 21–32)
CREAT SERPL-MCNC: 1.28 MG/DL (ref 0.55–1.02)
EOSINOPHIL NFR BLD: 1 % (ref 1–3)
ERYTHROCYTE [DISTWIDTH] IN BLOOD BY AUTOMATED COUNT: 13.7 % (ref 12.5–18)
GFR ESTIMATION: 42
GLOBULIN: 3.2 G/DL (ref 2.3–3.5)
GLUCOSE SERPL-MCNC: 132 MG/DL (ref 70–110)
HBA1C MFR BLD: 6.7 % (ref 4.5–6.2)
HCT VFR BLD AUTO: 41.5 % (ref 37–47)
HGB BLD-MCNC: 14 G/DL (ref 12–16)
LYMPHOCYTES NFR BLD: 6 % (ref 25–40)
MCH RBC QN AUTO: 32 PG (ref 27–31.2)
MCHC RBC AUTO-ENTMCNC: 33.7 G/DL (ref 31.8–35.4)
MCV RBC AUTO: 95 FL (ref 80–97)
MONOCYTES NFR BLD: 6 % (ref 1–15)
NEUTROPHILS # BLD AUTO: 7 10*3/UL (ref 1.8–7.7)
NEUTROPHILS NFR BLD: 84 % (ref 37–80)
NUCLEATED RED BLOOD CELLS: 0 %
PLATELETS: 208 10*3/UL (ref 142–424)
POTASSIUM SERPL-SCNC: 4.9 MMOL/L (ref 3.6–5.2)
PROT SERPL-MCNC: 7.4 G/DL (ref 6.4–8.2)
RBC # BLD AUTO: 4.37 10*6/UL (ref 4.2–5.4)
RBC MORPH BLD: ABNORMAL
SMALL PLATELETS BLD QL SMEAR: ADEQUATE
SODIUM BLD-SCNC: 138 MMOL/L (ref 135–145)
WBC # BLD: 8.1 10*3/UL (ref 4.6–10.2)

## 2020-10-26 PROCEDURE — 99214 PR OFFICE/OUTPT VISIT, EST, LEVL IV, 30-39 MIN: ICD-10-PCS | Mod: S$GLB,,, | Performed by: INTERNAL MEDICINE

## 2020-10-26 PROCEDURE — 99214 OFFICE O/P EST MOD 30 MIN: CPT | Mod: S$GLB,,, | Performed by: INTERNAL MEDICINE

## 2020-10-26 RX ORDER — INFLUENZA A VIRUS A/MICHIGAN/45/2015 X-275 (H1N1) ANTIGEN (FORMALDEHYDE INACTIVATED), INFLUENZA A VIRUS A/SINGAPORE/INFIMH-16-0019/2016 IVR-186 (H3N2) ANTIGEN (FORMALDEHYDE INACTIVATED), INFLUENZA B VIRUS B/PHUKET/3073/2013 ANTIGEN (FORMALDEHYDE INACTIVATED), AND INFLUENZA B VIRUS B/MARYLAND/15/2016 BX-69A ANTIGEN (FORMALDEHYDE INACTIVATED) 60; 60; 60; 60 UG/.7ML; UG/.7ML; UG/.7ML; UG/.7ML
INJECTION, SUSPENSION INTRAMUSCULAR
COMMUNITY
Start: 2020-10-14 | End: 2022-04-26 | Stop reason: ALTCHOICE

## 2020-10-26 NOTE — PROGRESS NOTES
Subjective:      Patient ID: Tiffany Adams is a 64 y.o. female.    Chief Complaint: Follow-up     Patient with h/o ESRD s/p Relative donor Renal Transplant in dec/2019. Patient reports that she is doing well. Patient is on Immunosuppressants ( tacrolimus)  and is tolerating it well.     Patient reports Blood sugars are running high. Patient has been started on Basaglar 36 units and tradjenta + Trulicity. Patient reports BS are running F:130--180 and Non-fasting BS = 160-290.Patient reports polyuria, polydipsia, No numbness in hands or feet.  Patient is not adherent to diet.    Pateint BP are under good control at home running between /74-86.    Patient has h/o Fibromyalgia and is using cymbalta with much help.       Review of Systems   Constitutional: Positive for weight loss (2 lb ). Negative for chills, diaphoresis, fever and malaise/fatigue.   HENT: Negative for congestion, ear pain, sinus pain, sore throat and tinnitus.    Eyes: Negative for blurred vision and photophobia.   Respiratory: Negative for cough, hemoptysis, shortness of breath and wheezing.    Cardiovascular: Negative for chest pain, palpitations, orthopnea, leg swelling and PND.   Gastrointestinal: Negative for abdominal pain, blood in stool, constipation, diarrhea, heartburn, melena, nausea and vomiting.   Genitourinary: Negative for dysuria, frequency and urgency.   Musculoskeletal: Negative for back pain, myalgias and neck pain.   Skin: Negative for rash.   Neurological: Negative for dizziness, tremors, seizures, loss of consciousness and weakness.        Muscle pains   Endo/Heme/Allergies: Negative for polydipsia.   Psychiatric/Behavioral: Negative for depression and hallucinations. The patient does not have insomnia.      Objective:     Physical Exam  Constitutional:       General: She is not in acute distress.     Appearance: She is not diaphoretic.   Neck:      Thyroid: No thyromegaly.   Cardiovascular:      Rate and Rhythm: Normal  rate and regular rhythm.      Heart sounds: Normal heart sounds. No murmur.   Pulmonary:      Effort: Pulmonary effort is normal. No respiratory distress.      Breath sounds: Normal breath sounds. No wheezing.   Chest:      Chest wall: No tenderness.   Abdominal:      General: Bowel sounds are normal. There is no distension.      Palpations: Abdomen is soft.      Tenderness: There is no abdominal tenderness.   Musculoskeletal:         General: No tenderness.   Lymphadenopathy:      Cervical: No cervical adenopathy.   Neurological:      Mental Status: She is alert and oriented to person, place, and time.      Cranial Nerves: No cranial nerve deficit.      Sensory: No sensory deficit.   Psychiatric:         Behavior: Behavior normal.         Thought Content: Thought content normal.         Judgment: Judgment normal.       Assessment:       ICD-10-CM ICD-9-CM   1. Diabetic polyneuropathy associated with type 2 diabetes mellitus  E11.42 250.60     357.2   2. Muscle cramps  R25.2 729.82   3. History of kidney transplant  Z94.0 V42.0       Plan:     Patient blood sugars are not under control.  Will increase Basaglar to 38 units  Patient BS fasing and Post-Parandial BS are both running high.   Advised patient to continue to monitor BS at home  Will repeat CMP and A1c  Patient blood pressures are under good control.  Will continue medication  Fibromyalgia is controlled with Cymbalta.  Will continue medication  Patient with renal transplant.  Will be aggressive with blood sugar and blood pressure control    Medication List with Changes/Refills   Current Medications    ASPIRIN (ECOTRIN) 81 MG EC TABLET    aspirin 81 mg tablet,delayed release   Take 1 tablet every day by oral route.    BASAGLAR KWIKPEN U-100 INSULIN GLARGINE 100 UNITS/ML (3ML) SUBQ PEN    Inject 36 Units into the skin nightly.    BLOOD SUGAR DIAGNOSTIC STRP    Use to check blood glucose 3 times daily    CARVEDILOL (COREG) 25 MG TABLET    Take 1 tablet (25 mg  "total) by mouth 2 (two) times daily with meals.    DULAGLUTIDE (TRULICITY) 0.75 MG/0.5 ML PEN INJECTOR    Inject 0.75 mg into the skin once a week.    DULOXETINE (CYMBALTA) 60 MG CAPSULE    Take 1 capsule (60 mg total) by mouth once daily.    FISH OIL/BORAGE/FLAX/OM3,6,9 1 (OMEGA 3-6-9 COMPLEX ORAL)    Take 1 capsule by mouth once daily.    FLUZONE HIGHDOSE QUAD 20-21  MCG/0.7 ML SYRG    ADM 0.7ML IM UTD    LANCETS (ONETOUCH DELICA LANCETS) 33 GAUGE MISC    Use as directed to check blood glucose 3 times daily    LINAGLIPTIN (TRADJENTA) 5 MG TAB TABLET    Take 1 tablet (5 mg total) by mouth once daily.    MYCOPHENOLATE (MYFORTIC) 360 MG TBEC    Take 360 mg by mouth 2 (two) times daily.    PEN NEEDLE, DIABETIC (BD ULTRA-FINE MINI PEN NEEDLE) 31 GAUGE X 3/16" NDLE    1 strip by Misc.(Non-Drug; Combo Route) route once daily.    PRENATAL VITAMIN 27 MG IRON- 0.8 MG TAB    Take 1 tablet by mouth once daily.    ROSUVASTATIN (CRESTOR) 10 MG TABLET    Take 1 tablet (10 mg total) by mouth once daily.    TACROLIMUS (PROGRAF) 1 MG CAP    Take 3 capsules by mouth once daily. Then 2 caps nightly    TRIAMCINOLONE ACETONIDE 0.1% (KENALOG) 0.1 % OINTMENT    Apply topically 2 (two) times daily. for 10 days              "

## 2020-11-11 DIAGNOSIS — N18.6 TYPE 2 DIABETES MELLITUS WITH CHRONIC KIDNEY DISEASE ON CHRONIC DIALYSIS, WITHOUT LONG-TERM CURRENT USE OF INSULIN: ICD-10-CM

## 2020-11-11 DIAGNOSIS — E11.29 TYPE 2 DIABETES MELLITUS WITH OTHER DIABETIC KIDNEY COMPLICATION, WITH LONG-TERM CURRENT USE OF INSULIN: ICD-10-CM

## 2020-11-11 DIAGNOSIS — E11.22 TYPE 2 DIABETES MELLITUS WITH CHRONIC KIDNEY DISEASE ON CHRONIC DIALYSIS, WITHOUT LONG-TERM CURRENT USE OF INSULIN: ICD-10-CM

## 2020-11-11 DIAGNOSIS — Z79.4 TYPE 2 DIABETES MELLITUS WITH OTHER DIABETIC KIDNEY COMPLICATION, WITH LONG-TERM CURRENT USE OF INSULIN: ICD-10-CM

## 2020-11-11 DIAGNOSIS — Z99.2 TYPE 2 DIABETES MELLITUS WITH CHRONIC KIDNEY DISEASE ON CHRONIC DIALYSIS, WITHOUT LONG-TERM CURRENT USE OF INSULIN: ICD-10-CM

## 2020-11-11 NOTE — TELEPHONE ENCOUNTER
----- Message from Blanca Araya sent at 11/11/2020  2:10 PM CST -----  patient needs call back regarding medication replacements due to cost..[phone]

## 2020-11-11 NOTE — TELEPHONE ENCOUNTER
Pt stated the bs meds are costing her $400+ monthly.. tradjenta  trulicity   basaglar    Staff advised pt to call ins and get formulary to see what tier the cheaper meds area nd see if MD will change them if pt can take it

## 2020-11-12 RX ORDER — DULAGLUTIDE 0.75 MG/.5ML
0.75 INJECTION, SOLUTION SUBCUTANEOUS WEEKLY
Qty: 4 PEN | Refills: 2 | Status: SHIPPED | OUTPATIENT
Start: 2020-11-12 | End: 2020-12-16 | Stop reason: SDUPTHER

## 2020-12-16 DIAGNOSIS — N18.6 TYPE 2 DIABETES MELLITUS WITH CHRONIC KIDNEY DISEASE ON CHRONIC DIALYSIS, WITHOUT LONG-TERM CURRENT USE OF INSULIN: ICD-10-CM

## 2020-12-16 DIAGNOSIS — Z79.4 TYPE 2 DIABETES MELLITUS WITH OTHER DIABETIC KIDNEY COMPLICATION, WITH LONG-TERM CURRENT USE OF INSULIN: ICD-10-CM

## 2020-12-16 DIAGNOSIS — Z99.2 TYPE 2 DIABETES MELLITUS WITH CHRONIC KIDNEY DISEASE ON CHRONIC DIALYSIS, WITHOUT LONG-TERM CURRENT USE OF INSULIN: ICD-10-CM

## 2020-12-16 DIAGNOSIS — E11.29 TYPE 2 DIABETES MELLITUS WITH OTHER DIABETIC KIDNEY COMPLICATION, WITH LONG-TERM CURRENT USE OF INSULIN: ICD-10-CM

## 2020-12-16 DIAGNOSIS — E11.22 TYPE 2 DIABETES MELLITUS WITH CHRONIC KIDNEY DISEASE ON CHRONIC DIALYSIS, WITHOUT LONG-TERM CURRENT USE OF INSULIN: ICD-10-CM

## 2020-12-16 DIAGNOSIS — E11.42 DIABETIC POLYNEUROPATHY ASSOCIATED WITH TYPE 2 DIABETES MELLITUS: ICD-10-CM

## 2020-12-17 RX ORDER — INSULIN GLARGINE 100 [IU]/ML
36 INJECTION, SOLUTION SUBCUTANEOUS NIGHTLY
Qty: 12 SYRINGE | Refills: 2 | Status: SHIPPED | OUTPATIENT
Start: 2020-12-17 | End: 2021-06-02 | Stop reason: SDUPTHER

## 2020-12-17 RX ORDER — LINAGLIPTIN 5 MG/1
5 TABLET, FILM COATED ORAL DAILY
Qty: 90 TABLET | Refills: 3 | Status: SHIPPED | OUTPATIENT
Start: 2020-12-17 | End: 2021-03-23 | Stop reason: SDUPTHER

## 2020-12-17 RX ORDER — DULAGLUTIDE 0.75 MG/.5ML
0.75 INJECTION, SOLUTION SUBCUTANEOUS WEEKLY
Qty: 4 PEN | Refills: 2 | Status: SHIPPED | OUTPATIENT
Start: 2020-12-17 | End: 2021-01-26

## 2021-01-13 ENCOUNTER — PATIENT OUTREACH (OUTPATIENT)
Dept: ADMINISTRATIVE | Facility: HOSPITAL | Age: 66
End: 2021-01-13

## 2021-01-26 ENCOUNTER — OFFICE VISIT (OUTPATIENT)
Dept: PRIMARY CARE CLINIC | Facility: CLINIC | Age: 66
End: 2021-01-26
Payer: MEDICARE

## 2021-01-26 VITALS
RESPIRATION RATE: 16 BRPM | HEIGHT: 63 IN | SYSTOLIC BLOOD PRESSURE: 103 MMHG | DIASTOLIC BLOOD PRESSURE: 67 MMHG | WEIGHT: 209 LBS | TEMPERATURE: 98 F | HEART RATE: 67 BPM | BODY MASS INDEX: 37.03 KG/M2 | OXYGEN SATURATION: 98 %

## 2021-01-26 DIAGNOSIS — M79.7 FIBROMYALGIA: ICD-10-CM

## 2021-01-26 DIAGNOSIS — E11.42 DIABETIC POLYNEUROPATHY ASSOCIATED WITH TYPE 2 DIABETES MELLITUS: Primary | ICD-10-CM

## 2021-01-26 DIAGNOSIS — I10 ESSENTIAL HYPERTENSION: ICD-10-CM

## 2021-01-26 PROCEDURE — 99214 OFFICE O/P EST MOD 30 MIN: CPT | Mod: S$GLB,,, | Performed by: INTERNAL MEDICINE

## 2021-01-26 PROCEDURE — 99214 PR OFFICE/OUTPT VISIT, EST, LEVL IV, 30-39 MIN: ICD-10-PCS | Mod: S$GLB,,, | Performed by: INTERNAL MEDICINE

## 2021-01-26 RX ORDER — INSULIN ASPART 100 [IU]/ML
INJECTION, SOLUTION INTRAVENOUS; SUBCUTANEOUS
Qty: 3 ML | Refills: 11 | Status: SHIPPED | OUTPATIENT
Start: 2021-01-26 | End: 2021-06-02 | Stop reason: SDUPTHER

## 2021-02-13 LAB
ALBUMIN SERPL-MCNC: 4.3 G/DL (ref 3.6–5.1)
ALBUMIN/GLOB SERPL: 1.9 (CALC) (ref 1–2.5)
ALP SERPL-CCNC: 127 U/L (ref 37–153)
ALT SERPL-CCNC: 17 U/L (ref 6–29)
AST SERPL-CCNC: 18 U/L (ref 10–35)
BASOPHILS # BLD AUTO: 42 CELLS/UL (ref 0–200)
BASOPHILS NFR BLD AUTO: 0.5 %
BILIRUB SERPL-MCNC: 0.9 MG/DL (ref 0.2–1.2)
BUN SERPL-MCNC: 20 MG/DL (ref 7–25)
BUN/CREAT SERPL: 18 (CALC) (ref 6–22)
CALCIUM SERPL-MCNC: 9.6 MG/DL (ref 8.6–10.4)
CHLORIDE SERPL-SCNC: 102 MMOL/L (ref 98–110)
CHOLEST SERPL-MCNC: 143 MG/DL
CHOLEST/HDLC SERPL: 4.8 (CALC)
CO2 SERPL-SCNC: 30 MMOL/L (ref 20–32)
CREAT SERPL-MCNC: 1.09 MG/DL (ref 0.5–0.99)
EOSINOPHIL # BLD AUTO: 191 CELLS/UL (ref 15–500)
EOSINOPHIL NFR BLD AUTO: 2.3 %
ERYTHROCYTE [DISTWIDTH] IN BLOOD BY AUTOMATED COUNT: 13.6 % (ref 11–15)
GFRSERPLBLD MDRD-ARVRAT: 52 ML/MIN/1.73M2
GLOBULIN SER CALC-MCNC: 2.3 G/DL (CALC) (ref 1.9–3.7)
GLUCOSE SERPL-MCNC: 145 MG/DL (ref 65–99)
HBA1C MFR BLD: 7.6 % OF TOTAL HGB
HCT VFR BLD AUTO: 40.5 % (ref 35–45)
HDLC SERPL-MCNC: 30 MG/DL
HGB BLD-MCNC: 14 G/DL (ref 11.7–15.5)
LDLC SERPL CALC-MCNC: 74 MG/DL (CALC)
LYMPHOCYTES # BLD AUTO: 307 CELLS/UL (ref 850–3900)
LYMPHOCYTES NFR BLD AUTO: 3.7 %
MCH RBC QN AUTO: 33.2 PG (ref 27–33)
MCHC RBC AUTO-ENTMCNC: 34.6 G/DL (ref 32–36)
MCV RBC AUTO: 96 FL (ref 80–100)
MONOCYTES # BLD AUTO: 764 CELLS/UL (ref 200–950)
MONOCYTES NFR BLD AUTO: 9.2 %
NEUTROPHILS # BLD AUTO: 6997 CELLS/UL (ref 1500–7800)
NEUTROPHILS NFR BLD AUTO: 84.3 %
NONHDLC SERPL-MCNC: 113 MG/DL (CALC)
PLATELET # BLD AUTO: 196 THOUSAND/UL (ref 140–400)
PMV BLD REES-ECKER: 10 FL (ref 7.5–12.5)
POTASSIUM SERPL-SCNC: 4.9 MMOL/L (ref 3.5–5.3)
PROT SERPL-MCNC: 6.6 G/DL (ref 6.1–8.1)
RBC # BLD AUTO: 4.22 MILLION/UL (ref 3.8–5.1)
SODIUM SERPL-SCNC: 140 MMOL/L (ref 135–146)
TRIGL SERPL-MCNC: 324 MG/DL
TSH SERPL-ACNC: 1.94 MIU/L (ref 0.4–4.5)
WBC # BLD AUTO: 8.3 THOUSAND/UL (ref 3.8–10.8)

## 2021-03-10 ENCOUNTER — OFFICE VISIT (OUTPATIENT)
Dept: PRIMARY CARE CLINIC | Facility: CLINIC | Age: 66
End: 2021-03-10
Payer: MEDICARE

## 2021-03-10 VITALS
TEMPERATURE: 98 F | DIASTOLIC BLOOD PRESSURE: 57 MMHG | HEIGHT: 63 IN | BODY MASS INDEX: 37.21 KG/M2 | RESPIRATION RATE: 16 BRPM | HEART RATE: 69 BPM | SYSTOLIC BLOOD PRESSURE: 115 MMHG | OXYGEN SATURATION: 97 % | WEIGHT: 210 LBS

## 2021-03-10 DIAGNOSIS — Z94.0 HISTORY OF KIDNEY TRANSPLANT: ICD-10-CM

## 2021-03-10 DIAGNOSIS — E11.42 DIABETIC POLYNEUROPATHY ASSOCIATED WITH TYPE 2 DIABETES MELLITUS: Primary | ICD-10-CM

## 2021-03-10 DIAGNOSIS — I10 ESSENTIAL HYPERTENSION: ICD-10-CM

## 2021-03-10 DIAGNOSIS — R01.1 SYSTOLIC MURMUR: ICD-10-CM

## 2021-03-10 PROCEDURE — 99214 OFFICE O/P EST MOD 30 MIN: CPT | Mod: S$GLB,,, | Performed by: INTERNAL MEDICINE

## 2021-03-10 PROCEDURE — 99214 PR OFFICE/OUTPT VISIT, EST, LEVL IV, 30-39 MIN: ICD-10-PCS | Mod: S$GLB,,, | Performed by: INTERNAL MEDICINE

## 2021-03-23 DIAGNOSIS — N18.6 TYPE 2 DIABETES MELLITUS WITH CHRONIC KIDNEY DISEASE ON CHRONIC DIALYSIS, WITHOUT LONG-TERM CURRENT USE OF INSULIN: ICD-10-CM

## 2021-03-23 DIAGNOSIS — E11.22 TYPE 2 DIABETES MELLITUS WITH CHRONIC KIDNEY DISEASE ON CHRONIC DIALYSIS, WITHOUT LONG-TERM CURRENT USE OF INSULIN: ICD-10-CM

## 2021-03-23 DIAGNOSIS — Z99.2 TYPE 2 DIABETES MELLITUS WITH CHRONIC KIDNEY DISEASE ON CHRONIC DIALYSIS, WITHOUT LONG-TERM CURRENT USE OF INSULIN: ICD-10-CM

## 2021-03-23 RX ORDER — LINAGLIPTIN 5 MG/1
5 TABLET, FILM COATED ORAL DAILY
Qty: 90 TABLET | Refills: 3 | Status: SHIPPED | OUTPATIENT
Start: 2021-03-23 | End: 2021-04-21

## 2021-04-21 ENCOUNTER — OFFICE VISIT (OUTPATIENT)
Dept: PRIMARY CARE CLINIC | Facility: CLINIC | Age: 66
End: 2021-04-21
Payer: MEDICARE

## 2021-04-21 VITALS
HEIGHT: 63 IN | BODY MASS INDEX: 36.86 KG/M2 | RESPIRATION RATE: 16 BRPM | HEART RATE: 80 BPM | SYSTOLIC BLOOD PRESSURE: 106 MMHG | OXYGEN SATURATION: 98 % | WEIGHT: 208 LBS | DIASTOLIC BLOOD PRESSURE: 66 MMHG | TEMPERATURE: 98 F

## 2021-04-21 DIAGNOSIS — I10 ESSENTIAL HYPERTENSION: ICD-10-CM

## 2021-04-21 DIAGNOSIS — E11.42 DIABETIC POLYNEUROPATHY ASSOCIATED WITH TYPE 2 DIABETES MELLITUS: Primary | ICD-10-CM

## 2021-04-21 DIAGNOSIS — R01.1 SYSTOLIC MURMUR: ICD-10-CM

## 2021-04-21 DIAGNOSIS — E78.00 PURE HYPERCHOLESTEROLEMIA: ICD-10-CM

## 2021-04-21 PROCEDURE — 99214 OFFICE O/P EST MOD 30 MIN: CPT | Mod: S$GLB,,, | Performed by: INTERNAL MEDICINE

## 2021-04-21 PROCEDURE — 99214 PR OFFICE/OUTPT VISIT, EST, LEVL IV, 30-39 MIN: ICD-10-PCS | Mod: S$GLB,,, | Performed by: INTERNAL MEDICINE

## 2021-04-21 RX ORDER — CARVEDILOL 12.5 MG/1
12.5 TABLET ORAL 2 TIMES DAILY WITH MEALS
Qty: 60 TABLET | Refills: 11 | Status: SHIPPED | OUTPATIENT
Start: 2021-04-21 | End: 2021-06-02

## 2021-06-02 ENCOUNTER — OFFICE VISIT (OUTPATIENT)
Dept: PRIMARY CARE CLINIC | Facility: CLINIC | Age: 66
End: 2021-06-02
Payer: MEDICARE

## 2021-06-02 VITALS
BODY MASS INDEX: 36.86 KG/M2 | HEART RATE: 71 BPM | DIASTOLIC BLOOD PRESSURE: 58 MMHG | HEIGHT: 63 IN | WEIGHT: 208 LBS | RESPIRATION RATE: 18 BRPM | OXYGEN SATURATION: 98 % | SYSTOLIC BLOOD PRESSURE: 115 MMHG | TEMPERATURE: 98 F

## 2021-06-02 DIAGNOSIS — I10 ESSENTIAL HYPERTENSION: Primary | ICD-10-CM

## 2021-06-02 DIAGNOSIS — M79.7 FIBROMYALGIA: ICD-10-CM

## 2021-06-02 DIAGNOSIS — E11.42 DIABETIC POLYNEUROPATHY ASSOCIATED WITH TYPE 2 DIABETES MELLITUS: ICD-10-CM

## 2021-06-02 DIAGNOSIS — F32.89 OTHER DEPRESSION: ICD-10-CM

## 2021-06-02 LAB
ALBUMIN SERPL-MCNC: 4.2 G/DL (ref 3.6–5.1)
ALBUMIN/CREAT UR: 9 MCG/MG CREAT
ALBUMIN/GLOB SERPL: 1.7 (CALC) (ref 1–2.5)
ALP SERPL-CCNC: 123 U/L (ref 37–153)
ALT SERPL-CCNC: 17 U/L (ref 6–29)
AST SERPL-CCNC: 19 U/L (ref 10–35)
BILIRUB SERPL-MCNC: 0.8 MG/DL (ref 0.2–1.2)
BUN SERPL-MCNC: 27 MG/DL (ref 7–25)
BUN/CREAT SERPL: 23 (CALC) (ref 6–22)
CALCIUM SERPL-MCNC: 9.3 MG/DL (ref 8.6–10.4)
CHLORIDE SERPL-SCNC: 104 MMOL/L (ref 98–110)
CHOLEST SERPL-MCNC: 136 MG/DL
CHOLEST/HDLC SERPL: 3.9 (CALC)
CO2 SERPL-SCNC: 28 MMOL/L (ref 20–32)
CREAT SERPL-MCNC: 1.18 MG/DL (ref 0.5–0.99)
CREAT UR-MCNC: 100 MG/DL (ref 20–275)
GLOBULIN SER CALC-MCNC: 2.5 G/DL (CALC) (ref 1.9–3.7)
GLUCOSE SERPL-MCNC: 168 MG/DL (ref 65–99)
HBA1C MFR BLD: 6.7 % OF TOTAL HGB
HDLC SERPL-MCNC: 35 MG/DL
LDLC SERPL CALC-MCNC: 69 MG/DL (CALC)
MICROALBUMIN UR-MCNC: 0.9 MG/DL
NONHDLC SERPL-MCNC: 101 MG/DL (CALC)
POTASSIUM SERPL-SCNC: 4.9 MMOL/L (ref 3.5–5.3)
PROT SERPL-MCNC: 6.7 G/DL (ref 6.1–8.1)
SODIUM SERPL-SCNC: 141 MMOL/L (ref 135–146)
TRIGL SERPL-MCNC: 218 MG/DL

## 2021-06-02 PROCEDURE — 99214 OFFICE O/P EST MOD 30 MIN: CPT | Mod: S$GLB,,, | Performed by: INTERNAL MEDICINE

## 2021-06-02 PROCEDURE — 99214 PR OFFICE/OUTPT VISIT, EST, LEVL IV, 30-39 MIN: ICD-10-PCS | Mod: S$GLB,,, | Performed by: INTERNAL MEDICINE

## 2021-06-02 RX ORDER — INSULIN GLARGINE 100 [IU]/ML
42 INJECTION, SOLUTION SUBCUTANEOUS NIGHTLY
Qty: 1 BOX | Refills: 11 | Status: SHIPPED | OUTPATIENT
Start: 2021-06-02 | End: 2021-09-16 | Stop reason: SDUPTHER

## 2021-06-02 RX ORDER — CARVEDILOL 6.25 MG/1
6.25 TABLET ORAL 2 TIMES DAILY WITH MEALS
Qty: 60 TABLET | Refills: 11 | Status: SHIPPED | OUTPATIENT
Start: 2021-06-02 | End: 2021-07-30 | Stop reason: SDUPTHER

## 2021-06-02 RX ORDER — DULOXETIN HYDROCHLORIDE 60 MG/1
60 CAPSULE, DELAYED RELEASE ORAL DAILY
Qty: 30 CAPSULE | Refills: 11 | Status: SHIPPED | OUTPATIENT
Start: 2021-06-02 | End: 2022-01-27 | Stop reason: SDUPTHER

## 2021-06-02 RX ORDER — INSULIN ASPART 100 [IU]/ML
INJECTION, SOLUTION INTRAVENOUS; SUBCUTANEOUS
Qty: 3 ML | Refills: 11 | Status: SHIPPED | OUTPATIENT
Start: 2021-06-02 | End: 2021-10-04 | Stop reason: SDUPTHER

## 2021-06-04 DIAGNOSIS — F32.89 OTHER DEPRESSION: ICD-10-CM

## 2021-06-04 DIAGNOSIS — M79.7 FIBROMYALGIA: ICD-10-CM

## 2021-06-04 RX ORDER — DULOXETIN HYDROCHLORIDE 60 MG/1
60 CAPSULE, DELAYED RELEASE ORAL DAILY
Qty: 30 CAPSULE | Refills: 11 | Status: CANCELLED | OUTPATIENT
Start: 2021-06-04 | End: 2022-06-04

## 2021-06-08 ENCOUNTER — TELEPHONE (OUTPATIENT)
Dept: PRIMARY CARE CLINIC | Facility: CLINIC | Age: 66
End: 2021-06-08

## 2021-07-30 DIAGNOSIS — I10 ESSENTIAL HYPERTENSION: ICD-10-CM

## 2021-07-30 RX ORDER — CARVEDILOL 6.25 MG/1
6.25 TABLET ORAL 2 TIMES DAILY WITH MEALS
Qty: 60 TABLET | Refills: 11 | Status: SHIPPED | OUTPATIENT
Start: 2021-07-30 | End: 2022-04-14 | Stop reason: SDUPTHER

## 2021-09-16 DIAGNOSIS — E11.42 DIABETIC POLYNEUROPATHY ASSOCIATED WITH TYPE 2 DIABETES MELLITUS: ICD-10-CM

## 2021-09-16 RX ORDER — INSULIN GLARGINE 100 [IU]/ML
42 INJECTION, SOLUTION SUBCUTANEOUS NIGHTLY
Qty: 1 BOX | Refills: 11 | Status: SHIPPED | OUTPATIENT
Start: 2021-09-16 | End: 2021-10-04 | Stop reason: SDUPTHER

## 2021-09-30 DIAGNOSIS — E11.42 DIABETIC POLYNEUROPATHY ASSOCIATED WITH TYPE 2 DIABETES MELLITUS: Primary | ICD-10-CM

## 2021-10-01 LAB
ALBUMIN SERPL-MCNC: 4.3 G/DL (ref 3.6–5.1)
ALBUMIN/GLOB SERPL: 1.7 (CALC) (ref 1–2.5)
ALP SERPL-CCNC: 175 U/L (ref 37–153)
ALT SERPL-CCNC: 25 U/L (ref 6–29)
AST SERPL-CCNC: 24 U/L (ref 10–35)
BILIRUB SERPL-MCNC: 0.9 MG/DL (ref 0.2–1.2)
BUN SERPL-MCNC: 16 MG/DL (ref 7–25)
BUN/CREAT SERPL: 15 (CALC) (ref 6–22)
CALCIUM SERPL-MCNC: 9.6 MG/DL (ref 8.6–10.4)
CHLORIDE SERPL-SCNC: 100 MMOL/L (ref 98–110)
CO2 SERPL-SCNC: 28 MMOL/L (ref 20–32)
CREAT SERPL-MCNC: 1.05 MG/DL (ref 0.5–0.99)
GLOBULIN SER CALC-MCNC: 2.6 G/DL (CALC) (ref 1.9–3.7)
GLUCOSE SERPL-MCNC: 208 MG/DL (ref 65–99)
HBA1C MFR BLD: 7.7 % OF TOTAL HGB
POTASSIUM SERPL-SCNC: 4.5 MMOL/L (ref 3.5–5.3)
PROT SERPL-MCNC: 6.9 G/DL (ref 6.1–8.1)
SODIUM SERPL-SCNC: 137 MMOL/L (ref 135–146)

## 2021-10-04 ENCOUNTER — OFFICE VISIT (OUTPATIENT)
Dept: PRIMARY CARE CLINIC | Facility: CLINIC | Age: 66
End: 2021-10-04
Payer: MEDICARE

## 2021-10-04 VITALS
DIASTOLIC BLOOD PRESSURE: 64 MMHG | OXYGEN SATURATION: 96 % | WEIGHT: 210 LBS | HEIGHT: 63 IN | HEART RATE: 69 BPM | RESPIRATION RATE: 16 BRPM | TEMPERATURE: 98 F | BODY MASS INDEX: 37.21 KG/M2 | SYSTOLIC BLOOD PRESSURE: 105 MMHG

## 2021-10-04 DIAGNOSIS — E11.42 DIABETIC POLYNEUROPATHY ASSOCIATED WITH TYPE 2 DIABETES MELLITUS: Primary | ICD-10-CM

## 2021-10-04 DIAGNOSIS — D84.9 IMMUNOSUPPRESSION: ICD-10-CM

## 2021-10-04 DIAGNOSIS — M79.7 FIBROMYALGIA: ICD-10-CM

## 2021-10-04 DIAGNOSIS — I10 ESSENTIAL HYPERTENSION: ICD-10-CM

## 2021-10-04 PROCEDURE — 99214 PR OFFICE/OUTPT VISIT, EST, LEVL IV, 30-39 MIN: ICD-10-PCS | Mod: S$GLB,,, | Performed by: INTERNAL MEDICINE

## 2021-10-04 PROCEDURE — 95251 CONT GLUC MNTR ANALYSIS I&R: CPT | Mod: S$GLB,,, | Performed by: INTERNAL MEDICINE

## 2021-10-04 PROCEDURE — 95251 PR GLUCOSE MONITOR, 72 HOUR, PHYS INTERP: ICD-10-PCS | Mod: S$GLB,,, | Performed by: INTERNAL MEDICINE

## 2021-10-04 PROCEDURE — 99214 OFFICE O/P EST MOD 30 MIN: CPT | Mod: S$GLB,,, | Performed by: INTERNAL MEDICINE

## 2021-10-04 RX ORDER — MYCOPHENOLIC ACID 180 MG/1
180 TABLET, DELAYED RELEASE ORAL 2 TIMES DAILY
COMMUNITY

## 2021-10-04 RX ORDER — INSULIN ASPART 100 [IU]/ML
INJECTION, SOLUTION INTRAVENOUS; SUBCUTANEOUS
Qty: 1 BOX | Refills: 3 | Status: SHIPPED | OUTPATIENT
Start: 2021-10-04 | End: 2022-02-08 | Stop reason: SDUPTHER

## 2021-10-04 RX ORDER — LINAGLIPTIN 5 MG/1
1 TABLET, FILM COATED ORAL DAILY
COMMUNITY
Start: 2021-09-21 | End: 2022-01-27 | Stop reason: SDUPTHER

## 2021-10-04 RX ORDER — INSULIN GLARGINE 100 [IU]/ML
48 INJECTION, SOLUTION SUBCUTANEOUS DAILY
Qty: 1 BOX | Refills: 3 | Status: SHIPPED | OUTPATIENT
Start: 2021-10-04 | End: 2022-01-27 | Stop reason: SDUPTHER

## 2021-10-04 RX ORDER — HUMAN INSULIN 100 [IU]/ML
6 INJECTION, SUSPENSION SUBCUTANEOUS 2 TIMES DAILY
COMMUNITY
Start: 2021-09-13 | End: 2021-10-04

## 2021-10-18 LAB
LEFT EYE DM RETINOPATHY: NEGATIVE
RIGHT EYE DM RETINOPATHY: NEGATIVE

## 2021-11-09 ENCOUNTER — OFFICE VISIT (OUTPATIENT)
Dept: PRIMARY CARE CLINIC | Facility: CLINIC | Age: 66
End: 2021-11-09
Payer: MEDICARE

## 2021-11-09 VITALS
RESPIRATION RATE: 16 BRPM | TEMPERATURE: 98 F | OXYGEN SATURATION: 97 % | DIASTOLIC BLOOD PRESSURE: 61 MMHG | WEIGHT: 210.38 LBS | HEIGHT: 63 IN | HEART RATE: 70 BPM | SYSTOLIC BLOOD PRESSURE: 105 MMHG | BODY MASS INDEX: 37.28 KG/M2

## 2021-11-09 DIAGNOSIS — E11.42 DIABETIC POLYNEUROPATHY ASSOCIATED WITH TYPE 2 DIABETES MELLITUS: Primary | ICD-10-CM

## 2021-11-09 DIAGNOSIS — R25.2 MUSCLE CRAMPS: ICD-10-CM

## 2021-11-09 DIAGNOSIS — I10 ESSENTIAL HYPERTENSION: ICD-10-CM

## 2021-11-09 PROCEDURE — 99214 PR OFFICE/OUTPT VISIT, EST, LEVL IV, 30-39 MIN: ICD-10-PCS | Mod: S$GLB,,, | Performed by: INTERNAL MEDICINE

## 2021-11-09 PROCEDURE — 99214 OFFICE O/P EST MOD 30 MIN: CPT | Mod: S$GLB,,, | Performed by: INTERNAL MEDICINE

## 2021-11-09 PROCEDURE — 95251 PR GLUCOSE MONITOR, 72 HOUR, PHYS INTERP: ICD-10-PCS | Mod: S$GLB,,, | Performed by: INTERNAL MEDICINE

## 2021-11-09 PROCEDURE — 95251 CONT GLUC MNTR ANALYSIS I&R: CPT | Mod: S$GLB,,, | Performed by: INTERNAL MEDICINE

## 2021-11-18 ENCOUNTER — PATIENT OUTREACH (OUTPATIENT)
Dept: ADMINISTRATIVE | Facility: HOSPITAL | Age: 66
End: 2021-11-18
Payer: MEDICARE

## 2022-01-11 ENCOUNTER — OFFICE VISIT (OUTPATIENT)
Dept: PRIMARY CARE CLINIC | Facility: CLINIC | Age: 67
End: 2022-01-11
Payer: MEDICARE

## 2022-01-11 VITALS
DIASTOLIC BLOOD PRESSURE: 70 MMHG | BODY MASS INDEX: 37.92 KG/M2 | TEMPERATURE: 98 F | WEIGHT: 214 LBS | HEIGHT: 63 IN | OXYGEN SATURATION: 97 % | SYSTOLIC BLOOD PRESSURE: 108 MMHG | RESPIRATION RATE: 16 BRPM | HEART RATE: 72 BPM

## 2022-01-11 DIAGNOSIS — Z12.31 BREAST CANCER SCREENING BY MAMMOGRAM: ICD-10-CM

## 2022-01-11 DIAGNOSIS — Z12.11 COLON CANCER SCREENING: ICD-10-CM

## 2022-01-11 DIAGNOSIS — E66.01 SEVERE OBESITY (BMI 35.0-39.9) WITH COMORBIDITY: ICD-10-CM

## 2022-01-11 DIAGNOSIS — Z78.0 POST-MENOPAUSAL: ICD-10-CM

## 2022-01-11 DIAGNOSIS — E11.42 DIABETIC POLYNEUROPATHY ASSOCIATED WITH TYPE 2 DIABETES MELLITUS: Primary | ICD-10-CM

## 2022-01-11 DIAGNOSIS — D84.9 IMMUNOSUPPRESSION: ICD-10-CM

## 2022-01-11 LAB
ALBUMIN SERPL-MCNC: 4.1 G/DL (ref 3.6–5.1)
ALBUMIN/GLOB SERPL: 1.7 (CALC) (ref 1–2.5)
ALP SERPL-CCNC: 156 U/L (ref 37–153)
ALT SERPL-CCNC: 25 U/L (ref 6–29)
AST SERPL-CCNC: 25 U/L (ref 10–35)
BILIRUB SERPL-MCNC: 0.8 MG/DL (ref 0.2–1.2)
BUN SERPL-MCNC: 22 MG/DL (ref 7–25)
BUN/CREAT SERPL: 21 (CALC) (ref 6–22)
CALCIUM SERPL-MCNC: 9.7 MG/DL (ref 8.6–10.4)
CHLORIDE SERPL-SCNC: 103 MMOL/L (ref 98–110)
CO2 SERPL-SCNC: 25 MMOL/L (ref 20–32)
CREAT SERPL-MCNC: 1.05 MG/DL (ref 0.5–0.99)
GLOBULIN SER CALC-MCNC: 2.4 G/DL (CALC) (ref 1.9–3.7)
GLUCOSE SERPL-MCNC: 120 MG/DL (ref 65–99)
HBA1C MFR BLD: 6.6 % OF TOTAL HGB
POTASSIUM SERPL-SCNC: 4.6 MMOL/L (ref 3.5–5.3)
PROT SERPL-MCNC: 6.5 G/DL (ref 6.1–8.1)
SODIUM SERPL-SCNC: 138 MMOL/L (ref 135–146)

## 2022-01-11 PROCEDURE — 99214 PR OFFICE/OUTPT VISIT, EST, LEVL IV, 30-39 MIN: ICD-10-PCS | Mod: S$GLB,,, | Performed by: INTERNAL MEDICINE

## 2022-01-11 PROCEDURE — 99214 OFFICE O/P EST MOD 30 MIN: CPT | Mod: S$GLB,,, | Performed by: INTERNAL MEDICINE

## 2022-01-11 NOTE — PROGRESS NOTES
Subjective:      Patient ID: Tiffany Adams is a 66 y.o. female.    Chief Complaint: Diabetes (2month f/u denies c/o)     Patient with h/o ESRD s/p Relative donor Renal Transplant in Dec/2019. Patient reports that she is doing well. Patient is on Immunosuppressants and is tolerating it well.     Patient has DM that seems under good control with Last A1c of 6.6 improved from 7.7.  Patient home blood sugars are under good control.  Patient average blood sugar is 195 with standard deviation of 59.  Patient fasting blood sugars are running between 160-180.  Patient is taking Basaglar 50 units daily and taking  8 units of Novolog before Breakfast and 16 units before Lunch And dinner.  Patient CGM is downloaded and blood sugars seems to be running 150-200 and fasting, postpharyngeal blood sugar specially after 1:00 p.m. are running higher than 200    Pateint BP are under good control at home running between 102-128/61-86. Patient denies any dizziness or lightheadedness but sometime feels tiered + patient may feel dizzy if she bends down and get up.      Patient has h/o Fibromyalgia and is using cymbalta with much help.       Review of Systems   Constitutional: Positive for diaphoresis. Negative for chills, fever, malaise/fatigue and weight loss (4lbs weight gain ).   HENT: Negative for congestion, ear pain, sinus pain, sore throat and tinnitus.    Eyes: Negative for blurred vision and photophobia.   Respiratory: Negative for cough, hemoptysis, shortness of breath and wheezing.    Cardiovascular: Negative for chest pain, palpitations, orthopnea, leg swelling and PND.   Gastrointestinal: Negative for abdominal pain, blood in stool, constipation, diarrhea, heartburn, melena, nausea and vomiting.   Genitourinary: Negative for dysuria, frequency and urgency.   Musculoskeletal: Positive for myalgias (Muscle cramps specially at night.). Negative for back pain and neck pain.   Skin: Negative for rash.   Neurological: Negative  for dizziness, tremors, seizures, loss of consciousness and weakness.        Muscle pains   Endo/Heme/Allergies: Negative for polydipsia.   Psychiatric/Behavioral: Negative for depression and hallucinations. The patient does not have insomnia.      Objective:     Physical Exam  Constitutional:       General: She is not in acute distress.     Appearance: She is not diaphoretic.   Neck:      Thyroid: No thyromegaly.   Cardiovascular:      Rate and Rhythm: Normal rate and regular rhythm.      Heart sounds: No murmur heard.      Pulmonary:      Effort: Pulmonary effort is normal. No respiratory distress.      Breath sounds: Normal breath sounds. No wheezing.   Chest:      Chest wall: No tenderness.   Abdominal:      General: Bowel sounds are normal. There is no distension.      Palpations: Abdomen is soft.      Tenderness: There is no abdominal tenderness.   Musculoskeletal:         General: No tenderness.   Lymphadenopathy:      Cervical: No cervical adenopathy.   Neurological:      Mental Status: She is alert and oriented to person, place, and time.      Cranial Nerves: No cranial nerve deficit.      Sensory: No sensory deficit.   Psychiatric:         Behavior: Behavior normal.         Thought Content: Thought content normal.         Judgment: Judgment normal.       Assessment:       ICD-10-CM ICD-9-CM   1. Diabetic polyneuropathy associated with type 2 diabetes mellitus  E11.42 250.60     357.2   2. Severe obesity (BMI 35.0-39.9) with comorbidity  E66.01 278.01   3. Immunosuppression  D84.9 279.9   4. Breast cancer screening by mammogram  Z12.31 V76.12   5. Post-menopausal  Z78.0 V49.81   6. Colon cancer screening  Z12.11 V76.51       Plan:       Patient last A1c of 6.6 is at goal.  Patient CGM suggest patient blood sugars are running high in last week.  Will increase Basaglar to 52 units daily + will increase NovoLog 10 units before breakfast and 18 units before lunch and dinner  Patient has CGM and blood sugars are  "being monitored aggressively  Will follow patient closely.  Advised patient to minimize carbohydrate intake and start exercise.  Patient blood pressures are under good control.  Will continue medication  Consult patient in detail about need to lose weight  Will order mammogram, Pap smear, colonoscopy  Patient has history of renal transplant and is on immunosuppression.     Medication List with Changes/Refills   Current Medications    ASPIRIN (ECOTRIN) 81 MG EC TABLET    aspirin 81 mg tablet,delayed release   Take 1 tablet every day by oral route.    BASAGLAR KWIKPEN U-100 INSULIN GLARGINE 100 UNITS/ML (3ML) SUBQ PEN    Inject 48 Units into the skin once daily.    BLOOD SUGAR DIAGNOSTIC STRP    Use to check blood glucose 3 times daily    CARVEDILOL (COREG) 6.25 MG TABLET    Take 1 tablet (6.25 mg total) by mouth 2 (two) times daily with meals.    DULOXETINE (CYMBALTA) 60 MG CAPSULE    Take 1 capsule (60 mg total) by mouth once daily.    FLUZONE HIGHDOSE QUAD 20-21  MCG/0.7 ML SYRG    ADM 0.7ML IM UTD    INSULIN ASPART U-100 (NOVOLOG FLEXPEN U-100 INSULIN) 100 UNIT/ML (3 ML) INPN PEN    Take 6 Units subcutaneously before Lunch and  dinner.    LANCETS (ONETOUCH DELICA LANCETS) 33 GAUGE [facility] [place]    Use as directed to check blood glucose 3 times daily    MYCOPHENOLATE (MYFORTIC) 180 MG TBEC    Take 180 mg by mouth once daily.    PEN NEEDLE, DIABETIC (BD ULTRA-FINE MINI PEN NEEDLE) 31 GAUGE X 3/16" NDLE    1 strip by Misc.(Non-Drug; Combo Route) route once daily.    PRENATAL VITAMIN 27 MG IRON- 0.8 MG TAB    Take 1 tablet by mouth once daily.    ROSUVASTATIN (CRESTOR) 10 MG TABLET    Take 1 tablet (10 mg total) by mouth once daily.    TACROLIMUS (PROGRAF) 1 MG CAP    Take 3 capsules by mouth once daily. Then 2 caps nightly    TRADJENTA 5 MG TAB TABLET    Take 1 tablet by mouth once daily.    TRIAMCINOLONE ACETONIDE 0.1% (KENALOG) 0.1 % OINTMENT    Apply topically 2 (two) times daily. for 10 days    "

## 2022-01-27 DIAGNOSIS — E11.42 DIABETIC POLYNEUROPATHY ASSOCIATED WITH TYPE 2 DIABETES MELLITUS: ICD-10-CM

## 2022-01-27 DIAGNOSIS — M79.7 FIBROMYALGIA: ICD-10-CM

## 2022-01-27 DIAGNOSIS — F32.89 OTHER DEPRESSION: ICD-10-CM

## 2022-01-27 RX ORDER — INSULIN GLARGINE 100 [IU]/ML
48 INJECTION, SOLUTION SUBCUTANEOUS DAILY
Qty: 43.2 ML | Refills: 0 | Status: SHIPPED | OUTPATIENT
Start: 2022-01-27 | End: 2022-05-24 | Stop reason: SDUPTHER

## 2022-01-27 RX ORDER — LINAGLIPTIN 5 MG/1
5 TABLET, FILM COATED ORAL DAILY
Qty: 90 TABLET | Refills: 3 | Status: SHIPPED | OUTPATIENT
Start: 2022-01-27 | End: 2022-08-23

## 2022-01-27 RX ORDER — DULOXETIN HYDROCHLORIDE 60 MG/1
60 CAPSULE, DELAYED RELEASE ORAL DAILY
Qty: 90 CAPSULE | Refills: 3 | Status: SHIPPED | OUTPATIENT
Start: 2022-01-27 | End: 2023-01-17

## 2022-02-01 ENCOUNTER — PATIENT OUTREACH (OUTPATIENT)
Dept: ADMINISTRATIVE | Facility: HOSPITAL | Age: 67
End: 2022-02-01
Payer: MEDICARE

## 2022-02-08 DIAGNOSIS — E11.42 DIABETIC POLYNEUROPATHY ASSOCIATED WITH TYPE 2 DIABETES MELLITUS: ICD-10-CM

## 2022-02-08 DIAGNOSIS — E78.00 PURE HYPERCHOLESTEROLEMIA: ICD-10-CM

## 2022-02-08 RX ORDER — INSULIN ASPART 100 [IU]/ML
INJECTION, SOLUTION INTRAVENOUS; SUBCUTANEOUS
Qty: 1 EACH | Refills: 3 | Status: SHIPPED | OUTPATIENT
Start: 2022-02-08 | End: 2022-02-09

## 2022-02-08 RX ORDER — ROSUVASTATIN CALCIUM 10 MG/1
10 TABLET, COATED ORAL DAILY
Qty: 30 TABLET | Refills: 6 | Status: SHIPPED | OUTPATIENT
Start: 2022-02-08 | End: 2022-10-25

## 2022-02-17 ENCOUNTER — TELEPHONE (OUTPATIENT)
Dept: PRIMARY CARE CLINIC | Facility: CLINIC | Age: 67
End: 2022-02-17
Payer: MEDICARE

## 2022-02-17 NOTE — TELEPHONE ENCOUNTER
----- Message from Zuleika Costello RN sent at 2/9/2022  4:58 PM CST -----  Contact: DARIUS Morrell    ----- Message -----  From: Angelica Selby  Sent: 2/9/2022   2:54 PM CST  To: Maritza Hernandez Staff    Please call Kiki from [facility] regarding a script for patient      Call back # [phone]  Ref # [id number]

## 2022-03-30 DIAGNOSIS — M85.80 OSTEOPENIA, UNSPECIFIED LOCATION: Primary | ICD-10-CM

## 2022-03-30 RX ORDER — LYSINE HCL 500 MG
1 TABLET ORAL DAILY
Qty: 100 TABLET | Refills: 3 | COMMUNITY
Start: 2022-03-30 | End: 2022-04-26

## 2022-04-07 ENCOUNTER — TELEPHONE (OUTPATIENT)
Dept: PRIMARY CARE CLINIC | Facility: CLINIC | Age: 67
End: 2022-04-07

## 2022-04-07 NOTE — TELEPHONE ENCOUNTER
----- Message from Hortencia Singh sent at 4/7/2022  2:07 PM CDT -----  Pt returning call, unsure of who called her. Call back is [phone]

## 2022-04-14 DIAGNOSIS — I10 ESSENTIAL HYPERTENSION: ICD-10-CM

## 2022-04-14 RX ORDER — CARVEDILOL 6.25 MG/1
6.25 TABLET ORAL 2 TIMES DAILY WITH MEALS
Qty: 60 TABLET | Refills: 11 | Status: SHIPPED | OUTPATIENT
Start: 2022-04-14 | End: 2022-08-23

## 2022-04-25 ENCOUNTER — PATIENT OUTREACH (OUTPATIENT)
Dept: ADMINISTRATIVE | Facility: HOSPITAL | Age: 67
End: 2022-04-25
Payer: MEDICARE

## 2022-04-26 ENCOUNTER — OFFICE VISIT (OUTPATIENT)
Dept: PRIMARY CARE CLINIC | Facility: CLINIC | Age: 67
End: 2022-04-26
Payer: MEDICARE

## 2022-04-26 VITALS
SYSTOLIC BLOOD PRESSURE: 107 MMHG | DIASTOLIC BLOOD PRESSURE: 73 MMHG | HEIGHT: 63 IN | BODY MASS INDEX: 38.27 KG/M2 | TEMPERATURE: 98 F | RESPIRATION RATE: 16 BRPM | OXYGEN SATURATION: 96 % | HEART RATE: 76 BPM | WEIGHT: 216 LBS

## 2022-04-26 DIAGNOSIS — I10 ESSENTIAL HYPERTENSION: ICD-10-CM

## 2022-04-26 DIAGNOSIS — E11.42 DIABETIC POLYNEUROPATHY ASSOCIATED WITH TYPE 2 DIABETES MELLITUS: Primary | ICD-10-CM

## 2022-04-26 DIAGNOSIS — E66.01 SEVERE OBESITY (BMI 35.0-39.9) WITH COMORBIDITY: ICD-10-CM

## 2022-04-26 DIAGNOSIS — F32.89 OTHER DEPRESSION: ICD-10-CM

## 2022-04-26 DIAGNOSIS — M85.80 OSTEOPENIA, UNSPECIFIED LOCATION: ICD-10-CM

## 2022-04-26 DIAGNOSIS — E11.42 DIABETIC POLYNEUROPATHY ASSOCIATED WITH TYPE 2 DIABETES MELLITUS: ICD-10-CM

## 2022-04-26 PROCEDURE — 99214 OFFICE O/P EST MOD 30 MIN: CPT | Mod: S$GLB,,, | Performed by: INTERNAL MEDICINE

## 2022-04-26 PROCEDURE — 99214 PR OFFICE/OUTPT VISIT, EST, LEVL IV, 30-39 MIN: ICD-10-PCS | Mod: S$GLB,,, | Performed by: INTERNAL MEDICINE

## 2022-04-26 RX ORDER — GABAPENTIN 300 MG/1
300 CAPSULE ORAL 3 TIMES DAILY
Qty: 90 CAPSULE | Refills: 11 | Status: SHIPPED | OUTPATIENT
Start: 2022-04-26 | End: 2022-04-26 | Stop reason: SDUPTHER

## 2022-04-26 RX ORDER — GABAPENTIN 300 MG/1
300 CAPSULE ORAL 3 TIMES DAILY
Qty: 90 CAPSULE | Refills: 11 | Status: SHIPPED | OUTPATIENT
Start: 2022-04-26 | End: 2022-10-21 | Stop reason: SDUPTHER

## 2022-04-26 NOTE — PROGRESS NOTES
Subjective:      Patient ID: Tiffany Adams is a 66 y.o. female.    Chief Complaint: Diabetes (F/u, reports recent visit w/ nephrologist and reports elevated potassium and d/c potassium )     Patient with h/o ESRD s/p Relative donor Renal Transplant in Dec/2019. Patient reports that she is doing well. Patient is on Immunosuppressants and is tolerating it well.     Patient has DM that seems under good control with Last A1c of 6.6 improved from 7.7.  Patient home blood sugars are under good control.  Patient has DEXCOM but   Patient is taking Basaglar 48 units daily and taking  8 units of Novolog before Breakfast and 18 units before Lunch And dinner.  Patient is on dex com, CGM but patient did not bring the  and so the data could not be downloaded.  Patient reports home blood sugars are under good control and denies any hypoglycemic episode. The patient reports tingling and numbness, bilateral feet x 3 months.  Symptoms are intermittent mostly in the morning worsened with activity and improved with rest.     Pateint BP are under good control at home running between 102-128/61-86. Patient denies any dizziness or lightheadedness but sometime feels tiered + patient may feel dizzy if she bends down and get up.      Patient has h/o Fibromyalgia and is using cymbalta with much help.       Review of Systems   Constitutional: Positive for diaphoresis. Negative for chills, fever, malaise/fatigue and weight loss (2 lbs weight gain ).   HENT: Negative for congestion, ear pain, sinus pain, sore throat and tinnitus.    Eyes: Negative for blurred vision and photophobia.   Respiratory: Negative for cough, hemoptysis, shortness of breath and wheezing.    Cardiovascular: Negative for chest pain, palpitations, orthopnea, leg swelling and PND.   Gastrointestinal: Negative for abdominal pain, blood in stool, constipation, diarrhea, heartburn, melena, nausea and vomiting.   Genitourinary: Negative for dysuria, frequency and  urgency.   Musculoskeletal: Positive for myalgias (Muscle cramps specially at night.). Negative for back pain and neck pain.   Skin: Negative for rash.   Neurological: Negative for dizziness, tremors, seizures, loss of consciousness and weakness.        Muscle pains   Endo/Heme/Allergies: Negative for polydipsia.   Psychiatric/Behavioral: Negative for depression and hallucinations. The patient does not have insomnia.      Objective:     Physical Exam  Constitutional:       General: She is not in acute distress.     Appearance: She is not diaphoretic.   Neck:      Thyroid: No thyromegaly.   Cardiovascular:      Rate and Rhythm: Normal rate and regular rhythm.      Heart sounds: No murmur heard.  Pulmonary:      Effort: Pulmonary effort is normal. No respiratory distress.      Breath sounds: Normal breath sounds. No wheezing.   Chest:      Chest wall: No tenderness.   Abdominal:      General: Bowel sounds are normal. There is no distension.      Palpations: Abdomen is soft.      Tenderness: There is no abdominal tenderness.      Comments: Left CVA tenderness.   Musculoskeletal:         General: No tenderness.   Lymphadenopathy:      Cervical: No cervical adenopathy.   Neurological:      Mental Status: She is alert and oriented to person, place, and time.      Cranial Nerves: No cranial nerve deficit.      Sensory: No sensory deficit.   Psychiatric:         Behavior: Behavior normal.         Thought Content: Thought content normal.         Judgment: Judgment normal.       Assessment:       ICD-10-CM ICD-9-CM   1. Diabetic polyneuropathy associated with type 2 diabetes mellitus  E11.42 250.60     357.2   2. Severe obesity (BMI 35.0-39.9) with comorbidity  E66.01 278.01   3. Osteopenia, unspecified location  M85.80 733.90   4. Essential hypertension  I10 401.9   5. Other depression  F32.89 311       Plan:     Patient last A1c of 6.6 at goal.  Patient reports CGM data suggest blood sugars are under good control  Will  continue same medication and will check A1c and CMP  Advised patient to come to clinic in a month time to make sure blood sugars are under good control.  Patient has developed symptoms of peripheral neuropathy with tingling and numbness in bilateral feet.  Will use gabapentin  Will also order nerve conduction study  Patient is consuming Cymbalta without much help with neuropathy  Patient blood pressures are under good control.  Will continue medication  Consult patient in detail about need to lose weight  Patient is referred for screening colonoscopy but has not yet received an appointment.  Will follow-up on referral  Patient last labs suggested osteopenia and is recommended to have calcium and vitamin-D  Patient depression seem under okay control with Cymbalta.  Will continue medication.   CVA tenderness but the Kidney on that side has been removed.   Gabapentin and exercise should help. If pain persist will do CT   Gabapentin should help with muscle cramps  [facility]abs [facility] suggested hyperkalemia.  Will repeat.  CMP    Medication List with Changes/Refills   New Medications    GABAPENTIN (NEURONTIN) 300 MG CAPSULE    Take 1 capsule (300 mg total) by mouth 3 (three) times daily.   Current Medications    ASPIRIN (ECOTRIN) 81 MG EC TABLET    aspirin 81 mg tablet,delayed release   Take 1 tablet every day by oral route.    BASAGLAR KWIKPEN U-100 INSULIN GLARGINE 100 UNITS/ML (3ML) SUBQ PEN    Inject 48 Units into the skin once daily.    BLOOD SUGAR DIAGNOSTIC STRP    Use to check blood glucose 3 times daily    CARVEDILOL (COREG) 6.25 MG TABLET    Take 1 tablet (6.25 mg total) by mouth 2 (two) times daily with meals.    DULOXETINE (CYMBALTA) 60 MG CAPSULE    Take 1 capsule (60 mg total) by mouth once daily.    INSULIN ASPART U-100 (NOVOLOG) 100 UNIT/ML (3 ML) INPN PEN    Take 6 Units subcutaneously before Lunch and  dinner.    LANCETS (ONETOUCH DELICA LANCETS) 33 GAUGE MISC    Use as directed to check blood  "glucose 3 times daily    MYCOPHENOLATE (MYFORTIC) 180 MG TBEC    Take 180 mg by mouth once daily.    PEN NEEDLE, DIABETIC (BD ULTRA-FINE MINI PEN NEEDLE) 31 GAUGE X 3/16" NDLE    1 strip by Misc.(Non-Drug; Combo Route) route once daily.    PRENATAL VITAMIN 27 MG IRON- 0.8 MG TAB    Take 1 tablet by mouth once daily.    ROSUVASTATIN (CRESTOR) 10 MG TABLET    Take 1 tablet (10 mg total) by mouth once daily.    TACROLIMUS (PROGRAF) 1 MG CAP    Take 3 capsules by mouth once daily. Then 2 caps nightly    TRADJENTA 5 MG TAB TABLET    Take 1 tablet (5 mg total) by mouth once daily.    TRIAMCINOLONE ACETONIDE 0.1% (KENALOG) 0.1 % OINTMENT    Apply topically 2 (two) times daily. for 10 days   Discontinued Medications    CALCIUM CARBONATE-VIT D3- MG CALCIUM- 400 UNIT TAB    Take 1 tablet by mouth once daily.    FLUZONE HIGHDOSE QUAD 20-21  MCG/0.7 ML SYRG    ADM 0.7ML IM UTD                            "

## 2022-05-24 DIAGNOSIS — E11.42 DIABETIC POLYNEUROPATHY ASSOCIATED WITH TYPE 2 DIABETES MELLITUS: ICD-10-CM

## 2022-05-24 RX ORDER — INSULIN GLARGINE 100 [IU]/ML
48 INJECTION, SOLUTION SUBCUTANEOUS DAILY
Qty: 43.2 ML | Refills: 0 | Status: SHIPPED | OUTPATIENT
Start: 2022-05-24 | End: 2022-08-18

## 2022-05-24 NOTE — TELEPHONE ENCOUNTER
----- Message from Adina Mo sent at 5/24/2022  9:04 AM CDT -----  .Type:  RX Refill Request    Who Called: self  Refill or New Rx:New RX  RX Name and Strength:  How is the patient currently taking it? (ex. 1XDay):1XDAY  Is this a 30 day or 90 day RX:  Preferred Pharmacy with phone number:.  [facility] - [place], [place] - [address] AT [address] [facility]  [address]  [facility] [address]  Phone: [phone] Fax: [phone]      Local or Mail Order:Mail  Ordering Provider:Maritza  Would the patient rather a call back or a response via [facility]? Call back  Best Call Back Number:.[phone]    Additional Information: Pt needs instructions for RX to be explained to her again, pt sts she only has 1 left.

## 2022-05-31 ENCOUNTER — TELEPHONE (OUTPATIENT)
Dept: GASTROENTEROLOGY | Facility: CLINIC | Age: 67
End: 2022-05-31
Payer: MEDICARE

## 2022-05-31 ENCOUNTER — TELEPHONE (OUTPATIENT)
Dept: PRIMARY CARE CLINIC | Facility: CLINIC | Age: 67
End: 2022-05-31
Payer: MEDICARE

## 2022-05-31 NOTE — TELEPHONE ENCOUNTER
----- Message from Marily Noriega sent at 5/31/2022  8:51 AM CDT -----  Patient need to speak  to nurse regarding her referral for colonoscopy. . Call back number [phone] (home) [phone] (work)

## 2022-05-31 NOTE — TELEPHONE ENCOUNTER
----- Message from Blanca Araya sent at 5/31/2022  1:19 PM CDT -----  pt needs call back to schedule from referral....[phone]

## 2022-06-03 VITALS — HEIGHT: 63 IN | WEIGHT: 215 LBS | BODY MASS INDEX: 38.09 KG/M2

## 2022-06-03 DIAGNOSIS — Z12.11 SCREENING FOR COLON CANCER: Primary | ICD-10-CM

## 2022-06-03 NOTE — TELEPHONE ENCOUNTER
Updated chart with patient. Scheduled colonoscopy with patient at [facility]. Reports her last colonoscopy was clear of polyps.-SHAL

## 2022-06-03 NOTE — TELEPHONE ENCOUNTER
----- Message from Marily Noriega sent at 6/2/2022  9:41 AM CDT -----  Regarding: DR GATES   Patient calling to schedule colonoscopy, Dr. Gates. Call back number [phone] (home) [phone] (work)   \

## 2022-06-05 RX ORDER — POLYETHYLENE GLYCOL 3350, SODIUM SULFATE ANHYDROUS, SODIUM BICARBONATE, SODIUM CHLORIDE, POTASSIUM CHLORIDE 236; 22.74; 6.74; 5.86; 2.97 G/4L; G/4L; G/4L; G/4L; G/4L
4 POWDER, FOR SOLUTION ORAL ONCE
Qty: 4000 ML | Refills: 0 | Status: SHIPPED | OUTPATIENT
Start: 2022-06-05 | End: 2022-06-05

## 2022-06-05 NOTE — TELEPHONE ENCOUNTER
Patient's novolog on sliding scale. Patient will adjust Novolog according to blood sugar level the day before procedure. Patient may do half of regular dose of insulin glargine the day before the procedure.   MLC

## 2022-06-14 ENCOUNTER — OFFICE VISIT (OUTPATIENT)
Dept: PRIMARY CARE CLINIC | Facility: CLINIC | Age: 67
End: 2022-06-14
Payer: MEDICARE

## 2022-06-14 VITALS
DIASTOLIC BLOOD PRESSURE: 64 MMHG | WEIGHT: 217 LBS | HEIGHT: 63 IN | SYSTOLIC BLOOD PRESSURE: 106 MMHG | BODY MASS INDEX: 38.45 KG/M2 | OXYGEN SATURATION: 96 % | HEART RATE: 72 BPM | RESPIRATION RATE: 16 BRPM | TEMPERATURE: 98 F

## 2022-06-14 DIAGNOSIS — K59.00 CONSTIPATION, UNSPECIFIED CONSTIPATION TYPE: ICD-10-CM

## 2022-06-14 DIAGNOSIS — M79.7 FIBROMYALGIA: ICD-10-CM

## 2022-06-14 DIAGNOSIS — I10 ESSENTIAL HYPERTENSION: ICD-10-CM

## 2022-06-14 DIAGNOSIS — E11.42 DIABETIC POLYNEUROPATHY ASSOCIATED WITH TYPE 2 DIABETES MELLITUS: Primary | ICD-10-CM

## 2022-06-14 DIAGNOSIS — E87.5 HYPERKALEMIA: ICD-10-CM

## 2022-06-14 DIAGNOSIS — E11.42 DIABETIC POLYNEUROPATHY ASSOCIATED WITH TYPE 2 DIABETES MELLITUS: ICD-10-CM

## 2022-06-14 DIAGNOSIS — E66.01 SEVERE OBESITY (BMI 35.0-39.9) WITH COMORBIDITY: ICD-10-CM

## 2022-06-14 PROCEDURE — 95251 CONT GLUC MNTR ANALYSIS I&R: CPT | Mod: S$GLB,,, | Performed by: INTERNAL MEDICINE

## 2022-06-14 PROCEDURE — 95251 PR GLUCOSE MONITOR, 72 HOUR, PHYS INTERP: ICD-10-PCS | Mod: S$GLB,,, | Performed by: INTERNAL MEDICINE

## 2022-06-14 PROCEDURE — 99214 OFFICE O/P EST MOD 30 MIN: CPT | Mod: S$GLB,,, | Performed by: INTERNAL MEDICINE

## 2022-06-14 PROCEDURE — 99214 PR OFFICE/OUTPT VISIT, EST, LEVL IV, 30-39 MIN: ICD-10-PCS | Mod: S$GLB,,, | Performed by: INTERNAL MEDICINE

## 2022-06-14 RX ORDER — DAPAGLIFLOZIN 5 MG/1
5 TABLET, FILM COATED ORAL DAILY
Qty: 30 TABLET | Refills: 2 | Status: SHIPPED | OUTPATIENT
Start: 2022-06-14 | End: 2022-08-23

## 2022-06-14 RX ORDER — DAPAGLIFLOZIN 5 MG/1
5 TABLET, FILM COATED ORAL DAILY
Qty: 30 TABLET | Refills: 2 | Status: SHIPPED | OUTPATIENT
Start: 2022-06-14 | End: 2022-06-14 | Stop reason: SDUPTHER

## 2022-06-14 NOTE — PROGRESS NOTES
Subjective:      Patient ID: Tiffany Adams is a 66 y.o. female.    Chief Complaint: Diabetes (1 month f/u) and Constipation     Patient with h/o ESRD s/p Relative donor Renal Transplant in Dec/2019. Patient reports that she is doing well. Patient is on Immunosuppressants and is tolerating it well.     Patient has DM that seems under good control with Last A1c of 6.4 improved from 6.6.  Patient home blood sugars are under good control.  Patient has dex, placed and the data suggest patient having blood sugar is running non 95 with blood sugar in range 48% of the time with 38% running high and 14% really high.  Patient blood sugar seems to be running between 120 to 280..  High blood sugars all noted after a 1 pm.  Patient reports compliance with insulin and is taking 48 units off beta-blocker and 10 units of NovoLog before breakfast and 18 units before lunch and dinner.  Patient denies polyuria, polydipsia, but reports numbness in bilateral feet.  Patient was given gabapentin with much help.    Patient's blood pressure seems under good control.  Patient home blood pressures are running between 100 and 110 over 60s.  Patient denies any dizziness lightheadedness    Patient has h/o Fibromyalgia and is using cymbalta with much help.     Patient presented today with complains of constipation x long.  Patient reports developing severe left-sided abdominal pain for which she was evaluated by a nurse practitioner and had x-rays that were normal.  Patient felt bloating and abdominal distention along with abdominal pain.  patient pain was thought to be secondary to constipation and had taken Dulcolax over-the-counter after which she had good bowel movements and abdominal pain improved.      Review of Systems   Constitutional: Positive for diaphoresis. Negative for chills, fever, malaise/fatigue and weight loss (2 lbs weight gain ).   HENT: Negative for congestion, ear pain, sinus pain, sore throat and tinnitus.    Eyes:  Negative for blurred vision and photophobia.   Respiratory: Negative for cough, hemoptysis, shortness of breath and wheezing.    Cardiovascular: Negative for chest pain, palpitations, orthopnea, leg swelling and PND.   Gastrointestinal: Positive for constipation. Negative for abdominal pain, blood in stool, diarrhea, heartburn, melena, nausea and vomiting.   Genitourinary: Negative for dysuria, frequency and urgency.   Musculoskeletal: Negative for back pain, myalgias and neck pain.   Skin: Negative for rash.   Neurological: Negative for dizziness, tremors, seizures, loss of consciousness and weakness.        Muscle pains   Endo/Heme/Allergies: Negative for polydipsia.   Psychiatric/Behavioral: Negative for depression and hallucinations. The patient does not have insomnia.      Objective:     Physical Exam  Constitutional:       General: She is not in acute distress.     Appearance: She is not diaphoretic.   Neck:      Thyroid: No thyromegaly.   Cardiovascular:      Rate and Rhythm: Normal rate and regular rhythm.      Heart sounds: No murmur heard.  Pulmonary:      Effort: Pulmonary effort is normal. No respiratory distress.      Breath sounds: Normal breath sounds. No wheezing.   Chest:      Chest wall: No tenderness.   Abdominal:      General: Bowel sounds are normal. There is no distension.      Palpations: Abdomen is soft.      Tenderness: There is no abdominal tenderness.   Musculoskeletal:         General: No tenderness.   Lymphadenopathy:      Cervical: No cervical adenopathy.   Neurological:      Mental Status: She is alert and oriented to person, place, and time.      Cranial Nerves: No cranial nerve deficit.      Sensory: No sensory deficit.   Psychiatric:         Behavior: Behavior normal.         Thought Content: Thought content normal.         Judgment: Judgment normal.       Assessment:       ICD-10-CM ICD-9-CM   1. Diabetic polyneuropathy associated with type 2 diabetes mellitus  E11.42 250.60      357.2   2. Constipation, unspecified constipation type  K59.00 564.00   3. Hyperkalemia  E87.5 276.7   4. Essential hypertension  I10 401.9   5. Severe obesity (BMI 35.0-39.9) with comorbidity  E66.01 278.01   6. Fibromyalgia  M79.7 729.1       Plan:     Patient last A1c of 6.4 is at goal, rather on the lower side.  Patient is on C GM and that gives him more liberty to aggressively control blood sugars.  Patient CGM suggested sugars are running high.  Will add Farxiga.  The medicine should help better control blood sugar + slow down the progression of chronic kidney disease  Also help with weight loss  Patient blood sugars after breakfast and lunch are running high will increase the pre meal insulin by 2 units  Increasing it to 12 units before breakfast and 20 units before lunch and dinner  Patient previous lab suggested high potassium and calcium.  Will repeat CMP  Patient has stopped calcium supplementation  Patient has constipation that can be secondary to hypercalcemia.  Will use Linzess  Patient blood pressures are under good control.  Will continue medication  Patient is scheduled for colonoscopy next month..  Advised to keep appointment  Advised patient about need to lose weight..  Patient sounds understanding  Advised patient to avoid ice cream at night        Medication List with Changes/Refills   New Medications    DAPAGLIFLOZIN (FARXIGA) 5 MG TAB TABLET    Take 1 tablet (5 mg total) by mouth once daily.    LINACLOTIDE (LINZESS) 145 MCG CAP CAPSULE    Take 1 capsule (145 mcg total) by mouth before breakfast.   Current Medications    ASPIRIN (ECOTRIN) 81 MG EC TABLET    aspirin 81 mg tablet,delayed release   Take 1 tablet every day by oral route.    BASAGLAR KWIKPEN U-100 INSULIN GLARGINE 100 UNITS/ML (3ML) SUBQ PEN    Inject 48 Units into the skin once daily.    BLOOD SUGAR DIAGNOSTIC STRP    Use to check blood glucose 3 times daily    CARVEDILOL (COREG) 6.25 MG TABLET    Take 1 tablet (6.25 mg total) by  "mouth 2 (two) times daily with meals.    DULOXETINE (CYMBALTA) 60 MG CAPSULE    Take 1 capsule (60 mg total) by mouth once daily.    GABAPENTIN (NEURONTIN) 300 MG CAPSULE    Take 1 capsule (300 mg total) by mouth 3 (three) times daily.    INSULIN ASPART U-100 (NOVOLOG) 100 UNIT/ML (3 ML) INPN PEN    Take 6 Units subcutaneously before Lunch and  dinner.    LANCETS (ONETOUCH DELICA LANCETS) 33 GAUGE [facility]    Use as directed to check blood glucose 3 times daily    MYCOPHENOLATE (MYFORTIC) 180 MG TBEC    Take 180 mg by mouth once daily.    PEN NEEDLE, DIABETIC (BD ULTRA-FINE MINI PEN NEEDLE) 31 GAUGE X 3/16" NDLE    1 strip by Misc.(Non-Drug; Combo Route) route once daily.    PRENATAL VITAMIN 27 MG IRON- 0.8 MG TAB    Take 1 tablet by mouth once daily.    ROSUVASTATIN (CRESTOR) 10 MG TABLET    Take 1 tablet (10 mg total) by mouth once daily.    TACROLIMUS (PROGRAF) 1 MG CAP    Take 3 capsules by mouth once daily. Then 2 caps nightly    TRADJENTA 5 MG TAB TABLET    Take 1 tablet (5 mg total) by mouth once daily.    TRIAMCINOLONE ACETONIDE 0.1% (KENALOG) 0.1 % OINTMENT    Apply topically 2 (two) times daily. for 10 days                              "

## 2022-06-14 NOTE — TELEPHONE ENCOUNTER
Patient called and request to switch script to [facility] instead of [facility].

## 2022-06-14 NOTE — TELEPHONE ENCOUNTER
----- Message from Norma Teran sent at 6/14/2022 10:07 AM CDT -----  Regarding: medications too expensive  Patient states that the two medications sent today to [facility] are too expensive, so she'd like them resent to [facility] mail order.

## 2022-06-15 LAB
ALBUMIN SERPL-MCNC: 4.2 G/DL (ref 3.6–5.1)
ALBUMIN/GLOB SERPL: 1.7 (CALC) (ref 1–2.5)
ALP SERPL-CCNC: 157 U/L (ref 37–153)
ALT SERPL-CCNC: 20 U/L (ref 6–29)
AST SERPL-CCNC: 33 U/L (ref 10–35)
BILIRUB SERPL-MCNC: 0.8 MG/DL (ref 0.2–1.2)
BUN SERPL-MCNC: 15 MG/DL (ref 7–25)
BUN/CREAT SERPL: 13 (CALC) (ref 6–22)
CALCIUM SERPL-MCNC: 9.6 MG/DL (ref 8.6–10.4)
CHLORIDE SERPL-SCNC: 104 MMOL/L (ref 98–110)
CO2 SERPL-SCNC: 21 MMOL/L (ref 20–32)
CREAT SERPL-MCNC: 1.14 MG/DL (ref 0.5–0.99)
GLOBULIN SER CALC-MCNC: 2.5 G/DL (CALC) (ref 1.9–3.7)
GLUCOSE SERPL-MCNC: 106 MG/DL (ref 65–99)
POTASSIUM SERPL-SCNC: 5.4 MMOL/L (ref 3.5–5.3)
PROT SERPL-MCNC: 6.7 G/DL (ref 6.1–8.1)
SODIUM SERPL-SCNC: 139 MMOL/L (ref 135–146)

## 2022-07-14 ENCOUNTER — TELEPHONE (OUTPATIENT)
Dept: GASTROENTEROLOGY | Facility: CLINIC | Age: 67
End: 2022-07-14
Payer: MEDICARE

## 2022-07-14 NOTE — TELEPHONE ENCOUNTER
----- Message from Zuleika Suggs sent at 7/14/2022  2:58 PM CDT -----  Regarding: Returning call  Contact: pt  Type:  Patient Returning Call    Who Called: Tiffany Adams   Who Left Message for Patient: unk  Does the patient know what this is regarding?:appt access  Would the patient rather a call back or a response via [facility]? Call back   Best Call Back Number: [phone]  Additional Information:

## 2022-07-14 NOTE — TELEPHONE ENCOUNTER
----- Message from Marylu Rene MA sent at 7/14/2022  2:21 PM CDT -----  Regarding: FW: appt access  Contact: Pt    ----- Message -----  From: Zuleika Suggs  Sent: 7/14/2022   2:15 PM CDT  To: Samuel STEPHEN Staff  Subject: appt access                                      Pt is calling to cancel and reschedule her colonoscopy. Please call back at [phone]//thank you acc

## 2022-07-15 ENCOUNTER — TELEPHONE (OUTPATIENT)
Dept: PRIMARY CARE CLINIC | Facility: CLINIC | Age: 67
End: 2022-07-15
Payer: MEDICARE

## 2022-07-15 NOTE — TELEPHONE ENCOUNTER
----- Message from Jenna Johnson sent at 7/15/2022  9:10 AM CDT -----  Contact: self  Pt calling stated she dropped off info on mediation she wants to take that is cheaper and has not heard back from the office.  Pt checking status of her request and can be reached at  [phone].    Thanks,

## 2022-07-15 NOTE — TELEPHONE ENCOUNTER
Pt stated the Farxiga and Linzess are too expensive and and req something cheaper.. staff advised pt to call ins to get formulary of meds covered so MD can  Chose from their covered list of meds

## 2022-07-20 ENCOUNTER — TELEPHONE (OUTPATIENT)
Dept: PRIMARY CARE CLINIC | Facility: CLINIC | Age: 67
End: 2022-07-20
Payer: MEDICARE

## 2022-07-20 ENCOUNTER — PATIENT OUTREACH (OUTPATIENT)
Dept: ADMINISTRATIVE | Facility: HOSPITAL | Age: 67
End: 2022-07-20
Payer: MEDICARE

## 2022-07-20 NOTE — PROGRESS NOTES
Working LC Colon non-compliant report: Chart/Chantelle/[employer] searched: Colonoscopy scheduled 9/13/2022

## 2022-07-20 NOTE — TELEPHONE ENCOUNTER
Returned call to patient, and she states that the Farxiga is too expensive. Patient contacted insurance and she was advised the replacement is Glipizide. Please advise.   Patient also stated that linzess is too expensive and causes diarrhea, she started Miralax and it is helping.

## 2022-07-20 NOTE — TELEPHONE ENCOUNTER
----- Message from Diane Fernando LPN sent at 7/19/2022  3:53 PM CDT -----    ----- Message -----  From: Katrina Trinidad  Sent: 7/19/2022   9:48 AM CDT  To: [facility] Staff    Type:  Patient Returning Call    Who Called:Tiffany Adams    Who Left Message for Patient: office   Does the patient know what this is regarding?:-  Would the patient rather a call back or a response via [facility]?    Best Call Back Number:[phone]    Additional Information:  returning a missed call

## 2022-07-21 NOTE — TELEPHONE ENCOUNTER
Using glipizide with insulin increases the chances of hypoglycemia.  Will adjust the dose of Basaglar if needed/

## 2022-07-22 NOTE — TELEPHONE ENCOUNTER
Returned call to patient, advised per MD recommendation, patient states she is using Novolog 12 units before breakfast and 20 units before lunch/dinner. Basaglar is used 50 units qd. Patient was advised to keep log of her bs readings at home and give us a call back in a week. Patient just recently finished her Farxiga.

## 2022-08-11 LAB
ALBUMIN SERPL-MCNC: 4 G/DL (ref 3.6–5.1)
ALBUMIN/CREAT UR: 4 MCG/MG CREAT
ALBUMIN/GLOB SERPL: 1.5 (CALC) (ref 1–2.5)
ALP SERPL-CCNC: 163 U/L (ref 37–153)
ALT SERPL-CCNC: 18 U/L (ref 6–29)
AST SERPL-CCNC: 19 U/L (ref 10–35)
BASOPHILS # BLD AUTO: 40 CELLS/UL (ref 0–200)
BASOPHILS NFR BLD AUTO: 0.5 %
BILIRUB SERPL-MCNC: 0.8 MG/DL (ref 0.2–1.2)
BUN SERPL-MCNC: 19 MG/DL (ref 7–25)
BUN/CREAT SERPL: 16 (CALC) (ref 6–22)
CALCIUM SERPL-MCNC: 9.7 MG/DL (ref 8.6–10.4)
CHLORIDE SERPL-SCNC: 104 MMOL/L (ref 98–110)
CHOLEST SERPL-MCNC: 155 MG/DL
CHOLEST/HDLC SERPL: 4.3 (CALC)
CO2 SERPL-SCNC: 30 MMOL/L (ref 20–32)
CREAT SERPL-MCNC: 1.19 MG/DL (ref 0.5–1.05)
CREAT UR-MCNC: 97 MG/DL (ref 20–275)
EGFR: 50 ML/MIN/1.73M2
EOSINOPHIL # BLD AUTO: 277 CELLS/UL (ref 15–500)
EOSINOPHIL NFR BLD AUTO: 3.5 %
ERYTHROCYTE [DISTWIDTH] IN BLOOD BY AUTOMATED COUNT: 13.1 % (ref 11–15)
GLOBULIN SER CALC-MCNC: 2.6 G/DL (CALC) (ref 1.9–3.7)
GLUCOSE SERPL-MCNC: 125 MG/DL (ref 65–99)
HBA1C MFR BLD: 6.1 % OF TOTAL HGB
HCT VFR BLD AUTO: 41 % (ref 35–45)
HDLC SERPL-MCNC: 36 MG/DL
HGB BLD-MCNC: 13.6 G/DL (ref 11.7–15.5)
LDLC SERPL CALC-MCNC: 88 MG/DL (CALC)
LYMPHOCYTES # BLD AUTO: 261 CELLS/UL (ref 850–3900)
LYMPHOCYTES NFR BLD AUTO: 3.3 %
MCH RBC QN AUTO: 31.8 PG (ref 27–33)
MCHC RBC AUTO-ENTMCNC: 33.2 G/DL (ref 32–36)
MCV RBC AUTO: 95.8 FL (ref 80–100)
MICROALBUMIN UR-MCNC: 0.4 MG/DL
MONOCYTES # BLD AUTO: 790 CELLS/UL (ref 200–950)
MONOCYTES NFR BLD AUTO: 10 %
NEUTROPHILS # BLD AUTO: 6533 CELLS/UL (ref 1500–7800)
NEUTROPHILS NFR BLD AUTO: 82.7 %
NONHDLC SERPL-MCNC: 119 MG/DL (CALC)
PLATELET # BLD AUTO: 190 THOUSAND/UL (ref 140–400)
PMV BLD REES-ECKER: 9.8 FL (ref 7.5–12.5)
POTASSIUM SERPL-SCNC: 4.8 MMOL/L (ref 3.5–5.3)
PROT SERPL-MCNC: 6.6 G/DL (ref 6.1–8.1)
RBC # BLD AUTO: 4.28 MILLION/UL (ref 3.8–5.1)
SODIUM SERPL-SCNC: 140 MMOL/L (ref 135–146)
TRIGL SERPL-MCNC: 215 MG/DL
TSH SERPL-ACNC: 2.3 MIU/L (ref 0.4–4.5)
WBC # BLD AUTO: 7.9 THOUSAND/UL (ref 3.8–10.8)

## 2022-08-19 DIAGNOSIS — Z12.11 SCREENING FOR MALIGNANT NEOPLASM OF COLON: Primary | ICD-10-CM

## 2022-08-19 NOTE — TELEPHONE ENCOUNTER
Returned pt call. Pt stated she needs her prep resent to her pharmacy due to not picking it up. (Golytely) Instructions reviewed and she voiced understanding. She will come by office to  paper copy of instructions. Rx request sent to [facility]

## 2022-08-19 NOTE — TELEPHONE ENCOUNTER
----- Message from Ashley Rodriguez MA sent at 7/27/2022  8:51 AM CDT -----  Regarding: FW: returning call  Contact: pt    ----- Message -----  From: Zuleika Suggs  Sent: 7/27/2022   8:45 AM CDT  To: Samuel STEPHEN Staff  Subject: returning call                                   Type:  Patient Returning Call    Who Called: Tiffany Adams   Who Left Message for Patient: unk  Does the patient know what this is regarding?: appt access   Would the patient rather a call back or a response via [facility]?  Call back   Best Call Back Number: [phone]  Additional Information:

## 2022-08-22 RX ORDER — POLYETHYLENE GLYCOL 3350, SODIUM SULFATE ANHYDROUS, SODIUM BICARBONATE, SODIUM CHLORIDE, POTASSIUM CHLORIDE 236; 22.74; 6.74; 5.86; 2.97 G/4L; G/4L; G/4L; G/4L; G/4L
4 POWDER, FOR SOLUTION ORAL ONCE
Qty: 4000 ML | Refills: 0 | Status: SHIPPED | OUTPATIENT
Start: 2022-08-22 | End: 2022-08-23

## 2022-08-23 ENCOUNTER — OFFICE VISIT (OUTPATIENT)
Dept: PRIMARY CARE CLINIC | Facility: CLINIC | Age: 67
End: 2022-08-23
Payer: MEDICARE

## 2022-08-23 VITALS
WEIGHT: 217 LBS | DIASTOLIC BLOOD PRESSURE: 68 MMHG | HEART RATE: 88 BPM | BODY MASS INDEX: 38.45 KG/M2 | RESPIRATION RATE: 16 BRPM | SYSTOLIC BLOOD PRESSURE: 103 MMHG | HEIGHT: 63 IN | TEMPERATURE: 98 F | OXYGEN SATURATION: 98 %

## 2022-08-23 DIAGNOSIS — E11.42 DIABETIC POLYNEUROPATHY ASSOCIATED WITH TYPE 2 DIABETES MELLITUS: Primary | ICD-10-CM

## 2022-08-23 DIAGNOSIS — E66.01 SEVERE OBESITY (BMI 35.0-39.9) WITH COMORBIDITY: ICD-10-CM

## 2022-08-23 DIAGNOSIS — K59.00 CONSTIPATION, UNSPECIFIED CONSTIPATION TYPE: ICD-10-CM

## 2022-08-23 DIAGNOSIS — M79.7 FIBROMYALGIA: ICD-10-CM

## 2022-08-23 PROCEDURE — 95251 CONT GLUC MNTR ANALYSIS I&R: CPT | Mod: S$GLB,,, | Performed by: INTERNAL MEDICINE

## 2022-08-23 PROCEDURE — 95251 PR GLUCOSE MONITOR, 72 HOUR, PHYS INTERP: ICD-10-PCS | Mod: S$GLB,,, | Performed by: INTERNAL MEDICINE

## 2022-08-23 PROCEDURE — 99214 OFFICE O/P EST MOD 30 MIN: CPT | Mod: S$GLB,,, | Performed by: INTERNAL MEDICINE

## 2022-08-23 PROCEDURE — 99214 PR OFFICE/OUTPT VISIT, EST, LEVL IV, 30-39 MIN: ICD-10-PCS | Mod: S$GLB,,, | Performed by: INTERNAL MEDICINE

## 2022-08-23 NOTE — PROGRESS NOTES
Subjective:      Patient ID: Tiffany Adams is a 66 y.o. female.    Chief Complaint: Diabetes     Patient with h/o ESRD s/p Relative donor Renal Transplant in Dec/2019. Patient reports that she is doing well. Patient is on Immunosuppressants and is tolerating it well.     Patient has DM that seems under good control with Last A1c of 6.1 improved from 6.4.  Patient home blood sugars are under good control.  Patient has DEXCOM and the data suggest patient blood sugar  Average is 143 with SD 52.  Blood sugars are in range 78% of the time and running high 15% of the time and very high 5% of the time. Patient reports some hypoglycemic episodes late evening.  Though downloaded data does not have any hypoglycemic episodes.  Patient is taking  Basaglar 50 units and 12 units of NovoLog before breakfast + 20 units before lunch and dinner.  Patient denies polyuria, polydipsia but has some numbness in both feet.  Patient is taking gabapentin with some help      Patient's blood pressure seems under good control.  Patient home blood pressures are running between 100 and 110 over 60s.  Patient denies any dizziness lightheadedness    Patient has h/o Fibromyalgia and is using cymbalta with much help.     Patient on last visit complained of constipation and was given Linzess without much help.  Patient has started using MiraLax and that seems to be helping.      Review of Systems   Constitutional: Positive for diaphoresis. Negative for chills, fever, malaise/fatigue and weight loss.   HENT: Negative for congestion, ear pain, sinus pain, sore throat and tinnitus.    Eyes: Negative for blurred vision and photophobia.   Respiratory: Negative for cough, hemoptysis, shortness of breath and wheezing.    Cardiovascular: Negative for chest pain, palpitations, orthopnea, leg swelling and PND.   Gastrointestinal: Positive for constipation (improved). Negative for abdominal pain, blood in stool, diarrhea, heartburn, melena, nausea and  vomiting.   Genitourinary: Negative for dysuria, frequency and urgency.   Musculoskeletal: Negative for back pain, myalgias and neck pain.   Skin: Negative for rash.   Neurological: Negative for dizziness, tremors, seizures, loss of consciousness and weakness.        Muscle pains   Endo/Heme/Allergies: Negative for polydipsia.   Psychiatric/Behavioral: Negative for depression and hallucinations. The patient does not have insomnia.      Objective:     Physical Exam  Constitutional:       General: She is not in acute distress.     Appearance: She is not diaphoretic.   Neck:      Thyroid: No thyromegaly.   Cardiovascular:      Rate and Rhythm: Normal rate and regular rhythm.      Heart sounds: No murmur heard.  Pulmonary:      Effort: Pulmonary effort is normal. No respiratory distress.      Breath sounds: Normal breath sounds. No wheezing.   Chest:      Chest wall: No tenderness.   Abdominal:      General: Bowel sounds are normal. There is no distension.      Palpations: Abdomen is soft.      Tenderness: There is no abdominal tenderness.   Musculoskeletal:         General: No tenderness.   Lymphadenopathy:      Cervical: No cervical adenopathy.   Neurological:      Mental Status: She is alert and oriented to person, place, and time.      Cranial Nerves: No cranial nerve deficit.      Sensory: No sensory deficit.   Psychiatric:         Behavior: Behavior normal.         Thought Content: Thought content normal.         Judgment: Judgment normal.       Assessment:       ICD-10-CM ICD-9-CM   1. Diabetic polyneuropathy associated with type 2 diabetes mellitus  E11.42 250.60     357.2   2. Constipation, unspecified constipation type  K59.00 564.00   3. Severe obesity (BMI 35.0-39.9) with comorbidity  E66.01 278.01   4. Fibromyalgia  M79.7 729.1       Plan:     Patient last A1c of 6.1 is at goal rather on lower side  Patient reports some hypoglycemic episode late in the evening.  Will stop Onglyza + decrease Basaglar to 46  "units  Patient was not able to afford Farxiga.   Will continue short-acting insulin 18 units before breakfast and 20 units before lunch and dinner  Patient blood pressures are under good control without any medication  Will continue to monitor  Patient has history of renal transplant and is on immunosuppressant..  Patient is tolerating them well  Will continue to monitor  Patient depression and muscle ache/fibromyalgia is controlled with Cymbalta  Will continue medication  Patient has decreased ice cream consumption at night.   Patient constipation is improved with MiraLax + patient has appointment for colonoscopy on 09/13/2022      Medication List with Changes/Refills   Current Medications    BASAGLAR KWIKPEN U-100 INSULIN GLARGINE 100 UNITS/ML SUBQ PEN    Inject 50 Units into the skin once daily.    BLOOD SUGAR DIAGNOSTIC STRP    Use to check blood glucose 3 times daily    DULOXETINE (CYMBALTA) 60 MG CAPSULE    Take 1 capsule (60 mg total) by mouth once daily.    GABAPENTIN (NEURONTIN) 300 MG CAPSULE    Take 1 capsule (300 mg total) by mouth 3 (three) times daily.    INSULIN ASPART U-100 (NOVOLOG FLEXPEN U-100 INSULIN) 100 UNIT/ML (3 ML) INPN PEN    INJECT SUBCUTANEOUSLY 10   UNITS BEFORE BREAKFAST AND 18 UNITS BEFORE LUNCH AND  DINNER.    LANCETS (ONETOUCH DELICA LANCETS) 33 GAUGE [facility] [place]    Use as directed to check blood glucose 3 times daily    MYCOPHENOLATE (MYFORTIC) 180 MG TBEC    Take 180 mg by mouth once daily.    PEN NEEDLE, DIABETIC (BD ULTRA-FINE MINI PEN NEEDLE) 31 GAUGE X 3/16" NDLE    1 strip by Misc.(Non-Drug; Combo Route) route once daily.    POLYETHYLENE GLYCOL (GOLYTELY) 236-22.74-6.74 -5.86 GRAM SUSPENSION    Take 4,000 mLs (4 L total) by mouth once. for 1 dose    PRENATAL VITAMIN 27 MG IRON- 0.8 MG TAB    Take 1 tablet by mouth once daily.    ROSUVASTATIN (CRESTOR) 10 MG TABLET    Take 1 tablet (10 mg total) by mouth once daily.    TACROLIMUS (PROGRAF) 1 MG CAP    Take 3 capsules by mouth once " daily. Then 2 caps nightly    TRIAMCINOLONE ACETONIDE 0.1% (KENALOG) 0.1 % OINTMENT    Apply topically 2 (two) times daily. for 10 days   Discontinued Medications    ASPIRIN (ECOTRIN) 81 MG EC TABLET    aspirin 81 mg tablet,delayed release   Take 1 tablet every day by oral route.    CARVEDILOL (COREG) 6.25 MG TABLET    Take 1 tablet (6.25 mg total) by mouth 2 (two) times daily with meals.    DAPAGLIFLOZIN (FARXIGA) 5 MG TAB TABLET    Take 1 tablet (5 mg total) by mouth once daily.    LINACLOTIDE (LINZESS) 145 MCG CAP CAPSULE    Take 1 capsule (145 mcg total) by mouth before breakfast.    TRADJENTA 5 MG TAB TABLET    Take 1 tablet (5 mg total) by mouth once daily.

## 2022-09-01 NOTE — TELEPHONE ENCOUNTER
"[place] [facility]  [address] [place] [address]  Phone: [phone]  Fax: [phone]    History & Physical         Provider: Dr. Idalia Baker    Patient Name: Tiffany SANON (age):1955  66 y.o.           Gender: female   Phone: [phone]     Referring Physician: David Salas     Vital Signs:   Height - 5' 3"  Weight - 217 lbs  BMI -  38.44    Plan: Colonoscopy    Encounter Diagnosis   Name Primary?    Screening for malignant neoplasm of colon Yes           History:      Past Medical History:   Diagnosis Date    Anxiety     Asthma     BMI 38.0-38.9,adult     Delirium 2019    Diabetes mellitus, type 2     Fibromyalgia     Hyperlipidemia     CARLIE (obstructive sleep apnea)     CPAP -    Renal cancer     s/p Left Nephrectomy     Renal transplant, status post       Past Surgical History:   Procedure Laterality Date    CHOLECYSTECTOMY      COLONOSCOPY  2015    Rough dates by patient    KIDNEY TRANSPLANT      KIDNEY TRANSPLANT  2019    Donated by daughter     NEPHRECTOMY Left     TUBAL LIGATION        Medication List with Changes/Refills   New Medications    POLYETHYLENE GLYCOL (GOLYTELY) 236-22.74-6.74 -5.86 GRAM SUSPENSION    Take 4,000 mLs (4 L total) by mouth once. for 1 dose   Current Medications    ASPIRIN (ECOTRIN) 81 MG EC TABLET    aspirin 81 mg tablet,delayed release   Take 1 tablet every day by oral route.    BASAGLAR KWIKPEN U-100 INSULIN GLARGINE 100 UNITS/ML SUBQ PEN    Inject 50 Units into the skin once daily.    BLOOD SUGAR DIAGNOSTIC STRP    Use to check blood glucose 3 times daily    CARVEDILOL (COREG) 6.25 MG TABLET    Take 1 tablet (6.25 mg total) by mouth 2 (two) times daily with meals.    DAPAGLIFLOZIN (FARXIGA) 5 MG TAB TABLET    Take 1 tablet (5 mg total) by mouth once daily.    DULOXETINE (CYMBALTA) 60 MG CAPSULE    Take 1 capsule (60 mg total) by mouth " "once daily.    GABAPENTIN (NEURONTIN) 300 MG CAPSULE    Take 1 capsule (300 mg total) by mouth 3 (three) times daily.    INSULIN ASPART U-100 (NOVOLOG FLEXPEN U-100 INSULIN) 100 UNIT/ML (3 ML) INPN PEN    INJECT SUBCUTANEOUSLY 10   UNITS BEFORE BREAKFAST AND 18 UNITS BEFORE LUNCH AND  DINNER.    LANCETS (ONETOUCH DELICA LANCETS) 33 GAUGE MISC    Use as directed to check blood glucose 3 times daily    LINACLOTIDE (LINZESS) 145 MCG CAP CAPSULE    Take 1 capsule (145 mcg total) by mouth before breakfast.    MYCOPHENOLATE (MYFORTIC) 180 MG TBEC    Take 180 mg by mouth once daily.    PEN NEEDLE, DIABETIC (BD ULTRA-FINE MINI PEN NEEDLE) 31 GAUGE X 3/16" NDLE    1 strip by Misc.(Non-Drug; Combo Route) route once daily.    PRENATAL VITAMIN 27 MG IRON- 0.8 MG TAB    Take 1 tablet by mouth once daily.    ROSUVASTATIN (CRESTOR) 10 MG TABLET    Take 1 tablet (10 mg total) by mouth once daily.    TACROLIMUS (PROGRAF) 1 MG CAP    Take 3 capsules by mouth once daily. Then 2 caps nightly    TRADJENTA 5 MG TAB TABLET    Take 1 tablet (5 mg total) by mouth once daily.    TRIAMCINOLONE ACETONIDE 0.1% (KENALOG) 0.1 % OINTMENT    Apply topically 2 (two) times daily. for 10 days      Review of patient's allergies indicates:   Allergen Reactions    Ibuprofen Swelling    Metformin Swelling      Family History   Problem Relation Age of Onset    Stroke Mother     Hypertension Mother     Diabetes Mother     Hypertension Father     Diabetes Father     Cancer Father     Cancer Sister     Cancer Brother       Social History     Tobacco Use    Smoking status: Never    Smokeless tobacco: Never   Substance Use Topics    Alcohol use: Not Currently    Drug use: Never        Physical Examination:     General Appearance:___________________________  HEENT: " _____________________________________  Abdomen:____________________________________  Heart:________________________________________  Lungs:_______________________________________  Extremities:___________________________________  Skin:_________________________________________  Endocrine:____________________________________  Genitourinary:_________________________________  Neurological:__________________________________      Patient has been evaluated immediately prior to sedation and is medically cleared for endoscopy with IVCS as an ASA class: ______      Physician Signature: _________________________       Date: ________  Time: ________

## 2022-09-08 ENCOUNTER — TELEPHONE (OUTPATIENT)
Dept: GASTROENTEROLOGY | Facility: CLINIC | Age: 67
End: 2022-09-08
Payer: MEDICARE

## 2022-09-08 NOTE — TELEPHONE ENCOUNTER
Returned pt call and left v/m that we rec'd her message regarding wanting to reschedule her colonoscopy with [facility] on 9/13/22. Pending call back. micha

## 2022-09-08 NOTE — TELEPHONE ENCOUNTER
----- Message from Blanca Araya sent at 9/2/2022  9:25 AM CDT -----  PT NEEDS CALL BACK TO RESCHEDULE PROCEDURE..[phone]

## 2022-10-18 ENCOUNTER — TELEPHONE (OUTPATIENT)
Dept: GASTROENTEROLOGY | Facility: CLINIC | Age: 67
End: 2022-10-18
Payer: MEDICARE

## 2022-10-18 NOTE — TELEPHONE ENCOUNTER
----- Message from Zlueika Suggs sent at 10/18/2022  1:49 PM CDT -----  Regarding: pt advice  Contact: pt  Pt is calling to see if she can go tomorrow to pre admit for her colonoscopy and do labs if needed. Please call back at [phone]//thank you acc

## 2022-10-18 NOTE — TELEPHONE ENCOUNTER
Returned patient call and notified her that it would be okay to preregister tomorrow and she would not require any labs. Patient voiced understanding.

## 2022-10-19 LAB
LEFT EYE DM RETINOPATHY: NEGATIVE
RIGHT EYE DM RETINOPATHY: NEGATIVE

## 2022-10-21 DIAGNOSIS — E11.42 DIABETIC POLYNEUROPATHY ASSOCIATED WITH TYPE 2 DIABETES MELLITUS: ICD-10-CM

## 2022-10-21 RX ORDER — GABAPENTIN 300 MG/1
300 CAPSULE ORAL 3 TIMES DAILY
Qty: 90 CAPSULE | Refills: 11 | Status: SHIPPED | OUTPATIENT
Start: 2022-10-21 | End: 2023-11-17 | Stop reason: SDUPTHER

## 2022-10-24 ENCOUNTER — TELEPHONE (OUTPATIENT)
Dept: GASTROENTEROLOGY | Facility: CLINIC | Age: 67
End: 2022-10-24
Payer: MEDICARE

## 2022-10-24 NOTE — TELEPHONE ENCOUNTER
"Patient states that she is scheduled for a colonoscopy at [facility] tomorrow but did not  her prep and ate breakfast this morning. Patient has previously rescheduled her procedure twice due to her  having surgery and transportation issues. States that she does not know if she came to the office and picked up her prep instructions as requested. States that she does not want to reschedule her procedure ( Next available date is 1/10/23) because "this should have already been done by now". States she will contact her PCP for another referral.   "

## 2022-10-24 NOTE — TELEPHONE ENCOUNTER
----- Message from Lucila Montalvo sent at 10/24/2022 11:49 AM CDT -----  Regarding: Prep Medication  Contact: patient  Per phone call with patient, she stated that she has a colonoscopy to be done on tomorrow and she does not have the medication for it and it is not at her pharmacy. Please return call at [phone] (home). The caller wants to keep her appointment on tomorrow.    Thanks,  SJ

## 2022-10-26 ENCOUNTER — PATIENT OUTREACH (OUTPATIENT)
Dept: ADMINISTRATIVE | Facility: HOSPITAL | Age: 67
End: 2022-10-26
Payer: MEDICARE

## 2023-01-05 DIAGNOSIS — E11.42 DIABETIC POLYNEUROPATHY ASSOCIATED WITH TYPE 2 DIABETES MELLITUS: ICD-10-CM

## 2023-01-05 RX ORDER — INSULIN GLARGINE 100 [IU]/ML
50 INJECTION, SOLUTION SUBCUTANEOUS DAILY
Qty: 15 ML | Refills: 3 | Status: SHIPPED | OUTPATIENT
Start: 2023-01-05 | End: 2023-05-19 | Stop reason: SDUPTHER

## 2023-01-05 NOTE — TELEPHONE ENCOUNTER
----- Message from Katrina Trinidad sent at 1/5/2023  8:54 AM CST -----  Type:  RX Refill Request    Who Called: Tiffany Adams    Refill or New Rx: Refill   RX Name and Strength:BASAGLAR KWIKPEN U-100 INSULIN glargine 100 units/mL SubQ pen  How is the patient currently taking it? (ex. 1XDay):1Xday   Is this a 30 day or 90 day RX:30  Preferred Pharmacy with phone number:  [facility] [place] [facility] [address] [facility]  [place] [address]  Phone: [phone] Fax: [phone]  Local or Mail Order:Mail  Ordering Provider: Maritza   Would the patient rather a call back or a response via [facility]? CHEPE   Best Call Back Number:[phone]    Additional Information:

## 2023-01-19 ENCOUNTER — OFFICE VISIT (OUTPATIENT)
Dept: PRIMARY CARE CLINIC | Facility: CLINIC | Age: 68
End: 2023-01-19
Payer: MEDICARE

## 2023-01-19 VITALS
WEIGHT: 213.81 LBS | DIASTOLIC BLOOD PRESSURE: 67 MMHG | SYSTOLIC BLOOD PRESSURE: 118 MMHG | BODY MASS INDEX: 37.88 KG/M2 | HEIGHT: 63 IN | HEART RATE: 118 BPM | OXYGEN SATURATION: 98 % | TEMPERATURE: 98 F

## 2023-01-19 DIAGNOSIS — E11.42 DIABETIC POLYNEUROPATHY ASSOCIATED WITH TYPE 2 DIABETES MELLITUS: ICD-10-CM

## 2023-01-19 DIAGNOSIS — R09.82 PND (POST-NASAL DRIP): ICD-10-CM

## 2023-01-19 DIAGNOSIS — R00.0 SINUS TACHYCARDIA: ICD-10-CM

## 2023-01-19 DIAGNOSIS — Z12.11 COLON CANCER SCREENING: Primary | ICD-10-CM

## 2023-01-19 PROCEDURE — 99214 PR OFFICE/OUTPT VISIT, EST, LEVL IV, 30-39 MIN: ICD-10-PCS | Mod: S$GLB,,, | Performed by: INTERNAL MEDICINE

## 2023-01-19 PROCEDURE — 99214 OFFICE O/P EST MOD 30 MIN: CPT | Mod: S$GLB,,, | Performed by: INTERNAL MEDICINE

## 2023-01-19 RX ORDER — METOPROLOL SUCCINATE 50 MG/1
50 TABLET, EXTENDED RELEASE ORAL DAILY
Qty: 90 TABLET | Refills: 3 | Status: SHIPPED | OUTPATIENT
Start: 2023-01-19 | End: 2023-03-02 | Stop reason: SDUPTHER

## 2023-01-19 RX ORDER — MONTELUKAST SODIUM 10 MG/1
10 TABLET ORAL NIGHTLY
Qty: 30 TABLET | Refills: 0 | Status: SHIPPED | OUTPATIENT
Start: 2023-01-19 | End: 2023-02-18

## 2023-01-19 RX ORDER — MYCOPHENOLIC ACID 360 MG/1
360 TABLET, DELAYED RELEASE ORAL DAILY
COMMUNITY
End: 2024-03-26

## 2023-01-19 RX ORDER — PEN NEEDLE, DIABETIC 30 GX3/16"
1 NEEDLE, DISPOSABLE MISCELLANEOUS 4 TIMES DAILY
Qty: 400 EACH | Refills: 3 | Status: SHIPPED | OUTPATIENT
Start: 2023-01-19 | End: 2023-01-19 | Stop reason: SDUPTHER

## 2023-01-19 RX ORDER — METOPROLOL SUCCINATE 50 MG/1
50 TABLET, EXTENDED RELEASE ORAL DAILY
Qty: 30 TABLET | Refills: 11 | Status: SHIPPED | OUTPATIENT
Start: 2023-01-19 | End: 2023-01-19 | Stop reason: SDUPTHER

## 2023-01-19 RX ORDER — SEMAGLUTIDE 1.34 MG/ML
0.25 INJECTION, SOLUTION SUBCUTANEOUS
Qty: 1 PEN | Refills: 5 | Status: SHIPPED | OUTPATIENT
Start: 2023-01-19 | End: 2023-03-02

## 2023-01-19 RX ORDER — PEN NEEDLE, DIABETIC 30 GX3/16"
1 NEEDLE, DISPOSABLE MISCELLANEOUS 4 TIMES DAILY
Qty: 400 EACH | Refills: 3 | Status: SHIPPED | OUTPATIENT
Start: 2023-01-19

## 2023-01-19 RX ORDER — MONTELUKAST SODIUM 10 MG/1
10 TABLET ORAL NIGHTLY
Qty: 30 TABLET | Refills: 0 | Status: SHIPPED | OUTPATIENT
Start: 2023-01-19 | End: 2023-01-19 | Stop reason: SDUPTHER

## 2023-01-19 RX ORDER — SEMAGLUTIDE 1.34 MG/ML
0.25 INJECTION, SOLUTION SUBCUTANEOUS
Qty: 1 PEN | Refills: 5 | Status: SHIPPED | OUTPATIENT
Start: 2023-01-19 | End: 2023-01-19 | Stop reason: SDUPTHER

## 2023-01-19 NOTE — PROGRESS NOTES
Subjective:      Patient ID: Tiffany Adams is a 67 y.o. female.    Chief Complaint: Annual Exam (Pt states she believes she has mucus stuck on her Vocal Cord ) and Referral (Mammogram and Colonoscopy )     Patient with h/o ESRD s/p Relative donor Renal Transplant in Dec/2019. Patient reports that she is doing well. Patient is on Immunosuppressants and is tolerating it well.     Patient has DM that seems under good control with Last A1c of 6.1 improved from 6.4.  Patient home blood sugars are under good control.  Patient has DEXCOM and the data could not be downloaded as my nurse is not here today.  Patient reports home blood sugars are under good control.  Patient reports multiple hypoglycemic episode in the middle of night that wakes her up.  Patient does not remember how low does not go.  Patient reports that she is not been able to eat much since she is having some allergy like symptoms that may also contribute to low blood sugar.  Patient is taking Basaglar 48 units + 10 units of NovoLog before breakfast and 20 before lunch and dinner.     Patient's blood pressure seems under good control.  Patient home blood pressures are running between 100 and 110 over 60s.  Patient denies any dizziness lightheadedness    Patient has h/o Fibromyalgia and is using cymbalta with much help.     Patient on last visit complained of constipation and was given Linzess without much help.  Patient has started using MiraLax and that seems to be helping.    Patient also complained of feeling of postnasal drip x 1 week.  Patient reports cough specially in the morning, no runny nose, nasal congestion, no fever or chill, ear fullness, sore throat, muscle aches.     Patient heart rate is running high today and reports that she feels palpitation on and off specially at night.  Patient denies any chest pain, shortness a of breath, dizziness, but has occasional lightheadedness..       Review of Systems   Constitutional:  Positive for  diaphoresis. Negative for chills, fever, malaise/fatigue and weight loss (3 lbs weight loss).   HENT:  Negative for congestion, ear pain, sinus pain, sore throat and tinnitus.    Eyes:  Negative for blurred vision and photophobia.   Respiratory:  Positive for cough. Negative for hemoptysis, shortness of breath and wheezing.    Cardiovascular:  Negative for chest pain, palpitations, orthopnea, leg swelling and PND.   Gastrointestinal:  Positive for constipation (improved). Negative for abdominal pain, blood in stool, diarrhea, heartburn, melena, nausea and vomiting.   Genitourinary:  Negative for dysuria, frequency and urgency.   Musculoskeletal:  Negative for back pain, myalgias and neck pain.   Skin:  Negative for rash.   Neurological:  Negative for dizziness, tremors, seizures, loss of consciousness and weakness.        Muscle pains   Endo/Heme/Allergies:  Negative for polydipsia.   Psychiatric/Behavioral:  Negative for depression and hallucinations. The patient does not have insomnia.    Objective:     Physical Exam  Constitutional:       General: She is not in acute distress.     Appearance: She is not diaphoretic.   HENT:      Mouth/Throat:      Comments: Mucoid discharge on posterior pharynx  Neck:      Thyroid: No thyromegaly.   Cardiovascular:      Rate and Rhythm: Regular rhythm. Tachycardia present.      Heart sounds: No murmur heard.  Pulmonary:      Effort: Pulmonary effort is normal. No respiratory distress.      Breath sounds: Normal breath sounds. No wheezing.   Chest:      Chest wall: No tenderness.   Abdominal:      General: Bowel sounds are normal. There is no distension.      Palpations: Abdomen is soft.      Tenderness: There is no abdominal tenderness.   Musculoskeletal:         General: No tenderness.   Lymphadenopathy:      Cervical: No cervical adenopathy.   Neurological:      Mental Status: She is alert and oriented to person, place, and time.      Cranial Nerves: No cranial nerve deficit.       Sensory: No sensory deficit.   Psychiatric:         Behavior: Behavior normal.         Thought Content: Thought content normal.         Judgment: Judgment normal.     Assessment:       ICD-10-CM ICD-9-CM   1. Colon cancer screening  Z12.11 V76.51   2. Diabetic polyneuropathy associated with type 2 diabetes mellitus  E11.42 250.60     357.2   3. Sinus tachycardia  R00.0 427.89         Plan:     Patient last A1c of 6.1 is at goal rather on lower side   Patient has some hypoglycemic episodes in the morning.  Will decrease Basaglar to 44 units now   Will use Ozempic to help lose weight and better control blood sugar.   Advised patient to decrease pre meal insulin to 6 units before breakfast 12 units before dinner and lunch.  Will also decrease Basaglar to 40 units after starting Ozempic  If blood sugar still stay on lower side advised to call my office.   Patient has postnasal drip.   Advised to use Flonase and Singulair.   Patient has tachycardia and palpitation.  Will do EKG  EKG suggested sinus tachycardia with heart rate of 114 beats per minute  Will add metoprolol..  Will check thyroid function  Patient has history of renal transplant and is on immunosuppressant..  Patient is tolerating them well  Will continue to monitor  Patient depression and muscle ache/fibromyalgia is controlled with Cymbalta  Will continue medication  Patient requests referral to GI for screening colonoscopy, patient was having difficulty with previous GI with appointments  Will refer to Dr. Baker      Medication List with Changes/Refills   New Medications    METOPROLOL SUCCINATE (TOPROL-XL) 50 MG 24 HR TABLET    Take 1 tablet (50 mg total) by mouth once daily.   Current Medications    BASAGLAR KWIKPEN U-100 INSULIN GLARGINE 100 UNITS/ML SUBQ PEN    Inject 50 Units into the skin once daily.    BLOOD SUGAR DIAGNOSTIC STRP    Use to check blood glucose 3 times daily    DULOXETINE (CYMBALTA) 60 MG CAPSULE    TAKE 1 CAPSULE ONCE DAILY     "GABAPENTIN (NEURONTIN) 300 MG CAPSULE    Take 1 capsule (300 mg total) by mouth 3 (three) times daily.    INSULIN ASPART U-100 (NOVOLOG FLEXPEN U-100 INSULIN) 100 UNIT/ML (3 ML) INPN PEN    INJECT SUBCUTANEOUSLY 12   UNITS BEFORE BREAKFAST AND 20 UNITS BEFORE LUNCH AND  DINNER.    LANCETS (ONETOUCH DELICA LANCETS) 33 GAUGE MISC    Use as directed to check blood glucose 3 times daily    MYCOPHENOLATE (MYFORTIC) 180 MG TBEC    Take 180 mg by mouth once daily.    MYCOPHENOLATE (MYFORTIC) 360 MG TBEC    Take 360 mg by mouth once daily. Take 360 mg in the morning and 180 mg in the evening    PRENATAL VITAMIN 27 MG IRON- 0.8 MG TAB    Take 1 tablet by mouth once daily.    ROSUVASTATIN (CRESTOR) 10 MG TABLET    TAKE 1 TABLET ONCE DAILY    TACROLIMUS (PROGRAF) 1 MG CAP    Take 3 capsules by mouth once daily. Then 2 caps nightly   Changed and/or Refilled Medications    Modified Medication Previous Medication    PEN NEEDLE, DIABETIC (BD ULTRA-FINE MINI PEN NEEDLE) 31 GAUGE X 3/16" NDLE pen needle, diabetic (BD ULTRA-FINE MINI PEN NEEDLE) 31 gauge x 3/16" Ndle       1 each by Misc.(Non-Drug; Combo Route) route 4 (four) times daily.    1 strip by Misc.(Non-Drug; Combo Route) route once daily.                                "

## 2023-01-20 LAB
ALBUMIN SERPL-MCNC: 4.2 G/DL (ref 3.6–5.1)
ALBUMIN/GLOB SERPL: 1.6 (CALC) (ref 1–2.5)
ALP SERPL-CCNC: 227 U/L (ref 37–153)
ALT SERPL-CCNC: 25 U/L (ref 6–29)
AST SERPL-CCNC: 21 U/L (ref 10–35)
BASOPHILS # BLD AUTO: 64 CELLS/UL (ref 0–200)
BASOPHILS NFR BLD AUTO: 0.7 %
BILIRUB SERPL-MCNC: 0.9 MG/DL (ref 0.2–1.2)
BUN SERPL-MCNC: 18 MG/DL (ref 7–25)
BUN/CREAT SERPL: 16 (CALC) (ref 6–22)
CALCIUM SERPL-MCNC: 9.4 MG/DL (ref 8.6–10.4)
CHLORIDE SERPL-SCNC: 100 MMOL/L (ref 98–110)
CO2 SERPL-SCNC: 27 MMOL/L (ref 20–32)
CREAT SERPL-MCNC: 1.1 MG/DL (ref 0.5–1.05)
EGFR: 54 ML/MIN/1.73M2
EOSINOPHIL # BLD AUTO: 237 CELLS/UL (ref 15–500)
EOSINOPHIL NFR BLD AUTO: 2.6 %
ERYTHROCYTE [DISTWIDTH] IN BLOOD BY AUTOMATED COUNT: 13.7 % (ref 11–15)
GLOBULIN SER CALC-MCNC: 2.6 G/DL (CALC) (ref 1.9–3.7)
GLUCOSE SERPL-MCNC: 133 MG/DL (ref 65–99)
HBA1C MFR BLD: 6.9 % OF TOTAL HGB
HCT VFR BLD AUTO: 43.2 % (ref 35–45)
HGB BLD-MCNC: 15.1 G/DL (ref 11.7–15.5)
LYMPHOCYTES # BLD AUTO: 410 CELLS/UL (ref 850–3900)
LYMPHOCYTES NFR BLD AUTO: 4.5 %
MCH RBC QN AUTO: 31.7 PG (ref 27–33)
MCHC RBC AUTO-ENTMCNC: 35 G/DL (ref 32–36)
MCV RBC AUTO: 90.6 FL (ref 80–100)
MONOCYTES # BLD AUTO: 810 CELLS/UL (ref 200–950)
MONOCYTES NFR BLD AUTO: 8.9 %
NEUTROPHILS # BLD AUTO: 7580 CELLS/UL (ref 1500–7800)
NEUTROPHILS NFR BLD AUTO: 83.3 %
PLATELET # BLD AUTO: 248 THOUSAND/UL (ref 140–400)
PMV BLD REES-ECKER: 9.9 FL (ref 7.5–12.5)
POTASSIUM SERPL-SCNC: 4.4 MMOL/L (ref 3.5–5.3)
PROT SERPL-MCNC: 6.8 G/DL (ref 6.1–8.1)
RBC # BLD AUTO: 4.77 MILLION/UL (ref 3.8–5.1)
SODIUM SERPL-SCNC: 137 MMOL/L (ref 135–146)
T4 FREE SERPL-MCNC: 1.2 NG/DL (ref 0.8–1.8)
TSH SERPL-ACNC: 1.11 MIU/L (ref 0.4–4.5)
WBC # BLD AUTO: 9.1 THOUSAND/UL (ref 3.8–10.8)

## 2023-01-26 DIAGNOSIS — E11.22 TYPE 2 DIABETES MELLITUS WITH END-STAGE RENAL DISEASE: ICD-10-CM

## 2023-01-26 DIAGNOSIS — N18.6 TYPE 2 DIABETES MELLITUS WITH END-STAGE RENAL DISEASE: ICD-10-CM

## 2023-01-26 RX ORDER — LANCETS 33 GAUGE
EACH MISCELLANEOUS
Qty: 100 EACH | Refills: 3 | Status: SHIPPED | OUTPATIENT
Start: 2023-01-26

## 2023-01-26 NOTE — TELEPHONE ENCOUNTER
----- Message from Jenna Johnson sent at 1/26/2023  9:57 AM CST -----  Contact: pt  Pt calling stating she has not rcvd medication for semaglutide (OZEMPIC) 0.25 mg or 0.5 mg(2 mg/1.5 mL) pen injector.  Pt stated she cannot afford this medication.  Pt also wants refill on lancets Script was sent 1/19/2023 but pt have rcvd.  Pt  request a call back at [phone]     Thanks,

## 2023-01-26 NOTE — TELEPHONE ENCOUNTER
Returned call to patient she states she cannot afford the Ozempic and would like to know if she can take Trajenta 5mg instead?   Patient states she had taken it previously and had 2 bottles at home. medication Exp 10/2024.

## 2023-02-14 LAB — BCS RECOMMENDATION EXT: NORMAL

## 2023-02-15 ENCOUNTER — PATIENT OUTREACH (OUTPATIENT)
Dept: ADMINISTRATIVE | Facility: HOSPITAL | Age: 68
End: 2023-02-15
Payer: MEDICARE

## 2023-03-02 ENCOUNTER — TELEPHONE (OUTPATIENT)
Dept: GASTROENTEROLOGY | Facility: CLINIC | Age: 68
End: 2023-03-02
Payer: MEDICARE

## 2023-03-02 ENCOUNTER — OFFICE VISIT (OUTPATIENT)
Dept: PRIMARY CARE CLINIC | Facility: CLINIC | Age: 68
End: 2023-03-02
Payer: MEDICARE

## 2023-03-02 VITALS
HEIGHT: 63 IN | SYSTOLIC BLOOD PRESSURE: 114 MMHG | OXYGEN SATURATION: 97 % | DIASTOLIC BLOOD PRESSURE: 79 MMHG | BODY MASS INDEX: 37.85 KG/M2 | HEART RATE: 112 BPM | RESPIRATION RATE: 16 BRPM | TEMPERATURE: 98 F | WEIGHT: 213.63 LBS

## 2023-03-02 DIAGNOSIS — E11.42 DIABETIC POLYNEUROPATHY ASSOCIATED WITH TYPE 2 DIABETES MELLITUS: ICD-10-CM

## 2023-03-02 DIAGNOSIS — R10.2 SUPRAPUBIC PAIN: ICD-10-CM

## 2023-03-02 DIAGNOSIS — N18.31 CHRONIC KIDNEY DISEASE, STAGE 3A: Primary | ICD-10-CM

## 2023-03-02 DIAGNOSIS — I10 ESSENTIAL HYPERTENSION: ICD-10-CM

## 2023-03-02 DIAGNOSIS — R00.0 SINUS TACHYCARDIA: ICD-10-CM

## 2023-03-02 DIAGNOSIS — D84.9 IMMUNOSUPPRESSION: ICD-10-CM

## 2023-03-02 DIAGNOSIS — E66.01 SEVERE OBESITY (BMI 35.0-39.9) WITH COMORBIDITY: ICD-10-CM

## 2023-03-02 PROCEDURE — 99214 OFFICE O/P EST MOD 30 MIN: CPT | Mod: 25,S$GLB,, | Performed by: INTERNAL MEDICINE

## 2023-03-02 PROCEDURE — 99214 PR OFFICE/OUTPT VISIT, EST, LEVL IV, 30-39 MIN: ICD-10-PCS | Mod: 25,S$GLB,, | Performed by: INTERNAL MEDICINE

## 2023-03-02 PROCEDURE — 95251 PR GLUCOSE MONITOR, 72 HOUR, PHYS INTERP: ICD-10-PCS | Mod: S$GLB,,, | Performed by: INTERNAL MEDICINE

## 2023-03-02 PROCEDURE — 95251 CONT GLUC MNTR ANALYSIS I&R: CPT | Mod: S$GLB,,, | Performed by: INTERNAL MEDICINE

## 2023-03-02 RX ORDER — METOPROLOL SUCCINATE 50 MG/1
50 TABLET, EXTENDED RELEASE ORAL DAILY
Qty: 90 TABLET | Refills: 3 | Status: SHIPPED | OUTPATIENT
Start: 2023-03-02 | End: 2023-08-25 | Stop reason: SDUPTHER

## 2023-03-02 NOTE — TELEPHONE ENCOUNTER
Dr Salas's office called asking if we scheduled a Colonoscopy for the patient.  I told Dr Salas's office that I will call the patient and schedule the Colonoscopy.  After looking into the patient's file, the patient cancelled 4 times due to not picking up the prep instructions, not having transportation and  having surgery.  See notes in chart.  The patient has Medicare.  Would you like me to schedule the Colonoscopy?  Please advise.  Gabriel

## 2023-03-02 NOTE — PROGRESS NOTES
Subjective:      Patient ID: Tiffany Adams is a 67 y.o. female.    Chief Complaint: Diabetes (F/u discuss medication, trajenta )    Patient with h/o ESRD s/p Relative donor Renal Transplant in Dec/2019. Patient reports that she is doing well. Patient is on Immunosuppressants and is tolerating it well.     Patient with diabetes with last A1c of 6.9 worsen from 6.1.  Patient has CGM placed and the data is downloaded that suggest average blood sugar is 174 with standard deviation of 46..  Patient blood sugars are in good range 63% of the time and running high 29% of the time and very high 8% of the times.  Patient is taking Basaglar 48 units and NovoLog 10 units before breakfast and 20 units before lunch and dinner.  Patient denies any polyuria, polydipsia, numbness in hands or feet (patient is taking gabapentin with much help)     Patient reports home blood pressures are under good control.  No dizziness, lightheadedness.  Patient on last visit was found to have sinus tachycardia and was given metoprolol but has not yet started the medication.     Review of Systems   Constitutional:  Negative for chills, diaphoresis, fever, malaise/fatigue and weight loss.   HENT:  Negative for congestion, ear pain, sinus pain, sore throat and tinnitus.    Eyes:  Negative for blurred vision and photophobia.   Respiratory:  Negative for cough, hemoptysis, shortness of breath and wheezing.    Cardiovascular:  Negative for chest pain, palpitations, orthopnea, leg swelling and PND.   Gastrointestinal:  Negative for abdominal pain, blood in stool, constipation, diarrhea, heartburn, melena, nausea and vomiting.   Genitourinary:  Negative for dysuria, frequency and urgency.   Musculoskeletal:  Negative for back pain, myalgias and neck pain.   Skin:  Negative for rash.   Neurological:  Negative for dizziness, tremors, seizures, loss of consciousness and weakness.   Endo/Heme/Allergies:  Negative for polydipsia.   Psychiatric/Behavioral:   Negative for depression and hallucinations. The patient does not have insomnia.    Objective:     Physical Exam  Constitutional:       General: She is not in acute distress.     Appearance: She is not diaphoretic.   Neck:      Thyroid: No thyromegaly.   Cardiovascular:      Rate and Rhythm: Normal rate and regular rhythm.      Heart sounds: Normal heart sounds. No murmur heard.  Pulmonary:      Effort: Pulmonary effort is normal. No respiratory distress.      Breath sounds: Normal breath sounds. No wheezing.   Abdominal:      General: Bowel sounds are normal. There is no distension.      Palpations: Abdomen is soft.      Tenderness: There is no abdominal tenderness.   Lymphadenopathy:      Cervical: No cervical adenopathy.   Neurological:      Mental Status: She is alert and oriented to person, place, and time.   Psychiatric:         Behavior: Behavior normal.         Thought Content: Thought content normal.         Judgment: Judgment normal.     Assessment:       ICD-10-CM ICD-9-CM   1. Chronic kidney disease, stage 3a  N18.31 585.3   2. Severe obesity (BMI 35.0-39.9) with comorbidity  E66.01 278.01   3. Immunosuppression  D84.9 279.9   4. Suprapubic pain  R10.2 789.09   5. Diabetic polyneuropathy associated with type 2 diabetes mellitus  E11.42 250.60     357.2   6. Essential hypertension  I10 401.9       Plan:     Patient with diabetes with last A1c of 6.9 worsen from 6.1  Patient reports better adherence with diet and blood sugars seem better control  Patient blood sugar after meals are still running high  Advised patient to take pre meal insulin before meals and not wait after meals  Patient blood sugars are running high after dinner will increase pre meal insulin to 22 units  Patient will take pre meal insulin 10 before breakfast 20 before lunch and 20 to before dinner  Patient had some hypoglycemic episode about a month ago in the morning but in last 2 weeks no hypoglycemic episode noted  Advised patient to  "decrease Basaglar to 44 units if develops hypoglycemia again  Patient has history of end-stage renal disease s/p live donor renal transplant  Patient is on immunosuppressant and is tolerating well  Patient complains of on and off suprapubic pain Will check UA to rule out UTI  Patient blood pressures are under okay control but reports some dizziness..   Will do orthostatic blood pressures..  Repeat blood pressure does not suggest orthostatic hypertension  Patient heart rate is still running high advised patient to restart metoprolol  Advised patient about need to loose vega.     Medication List with Changes/Refills   Current Medications    BASAGLAR KWIKPEN U-100 INSULIN GLARGINE 100 UNITS/ML SUBQ PEN    Inject 50 Units into the skin once daily.    BLOOD SUGAR DIAGNOSTIC STRP    Use to check blood glucose 3 times daily    DULOXETINE (CYMBALTA) 60 MG CAPSULE    TAKE 1 CAPSULE ONCE DAILY    GABAPENTIN (NEURONTIN) 300 MG CAPSULE    Take 1 capsule (300 mg total) by mouth 3 (three) times daily.    INSULIN ASPART U-100 (NOVOLOG FLEXPEN U-100 INSULIN) 100 UNIT/ML (3 ML) INPN PEN    INJECT SUBCUTANEOUSLY 12   UNITS BEFORE BREAKFAST AND 20 UNITS BEFORE LUNCH AND  DINNER.    LANCETS (ONETOUCH DELICA LANCETS) 33 GAUGE MISC    Use as directed to check blood glucose 3 times daily    METOPROLOL SUCCINATE (TOPROL-XL) 50 MG 24 HR TABLET    Take 1 tablet (50 mg total) by mouth once daily.    MYCOPHENOLATE (MYFORTIC) 180 MG TBEC    Take 180 mg by mouth once daily.    MYCOPHENOLATE (MYFORTIC) 360 MG TBEC    Take 360 mg by mouth once daily. Take 360 mg in the morning and 180 mg in the evening    PEN NEEDLE, DIABETIC (BD ULTRA-FINE MINI PEN NEEDLE) 31 GAUGE X 3/16" NDLE    1 each by Misc.(Non-Drug; Combo Route) route 4 (four) times daily.    PRENATAL VITAMIN 27 MG IRON- 0.8 MG TAB    Take 1 tablet by mouth once daily.    ROSUVASTATIN (CRESTOR) 10 MG TABLET    TAKE 1 TABLET ONCE DAILY    TACROLIMUS (PROGRAF) 1 MG CAP    Take 3 capsules by " mouth once daily. Then 2 caps nightly   Discontinued Medications    SEMAGLUTIDE (OZEMPIC) 0.25 MG OR 0.5 MG(2 MG/1.5 ML) PEN INJECTOR    Inject 0.25 mg into the skin every 7 days.

## 2023-03-03 NOTE — TELEPHONE ENCOUNTER
The patient has rescheduled colonoscopy multiple times and seems the last time she canceled she stated she would get a referral to another GI doctor. Patient may be scheduled for next available colonoscopy with Dr. Baker at [facility] with golytely but she must make and keep the procedure as scheduled.   MLC

## 2023-03-10 ENCOUNTER — PES CALL (OUTPATIENT)
Dept: ADMINISTRATIVE | Facility: OTHER | Age: 68
End: 2023-03-10
Payer: MEDICARE

## 2023-05-05 ENCOUNTER — OFFICE VISIT (OUTPATIENT)
Dept: PRIMARY CARE CLINIC | Facility: CLINIC | Age: 68
End: 2023-05-05
Payer: MEDICARE

## 2023-05-05 VITALS
OXYGEN SATURATION: 99 % | TEMPERATURE: 98 F | RESPIRATION RATE: 16 BRPM | WEIGHT: 215 LBS | HEIGHT: 63 IN | BODY MASS INDEX: 38.09 KG/M2 | DIASTOLIC BLOOD PRESSURE: 62 MMHG | SYSTOLIC BLOOD PRESSURE: 114 MMHG | HEART RATE: 90 BPM

## 2023-05-05 DIAGNOSIS — M79.7 FIBROMYALGIA: ICD-10-CM

## 2023-05-05 DIAGNOSIS — E11.42 DIABETIC POLYNEUROPATHY ASSOCIATED WITH TYPE 2 DIABETES MELLITUS: Primary | ICD-10-CM

## 2023-05-05 DIAGNOSIS — D84.9 IMMUNOSUPPRESSION: ICD-10-CM

## 2023-05-05 DIAGNOSIS — I10 ESSENTIAL HYPERTENSION: ICD-10-CM

## 2023-05-05 PROCEDURE — 99214 OFFICE O/P EST MOD 30 MIN: CPT | Mod: S$GLB,,, | Performed by: INTERNAL MEDICINE

## 2023-05-05 PROCEDURE — 99214 PR OFFICE/OUTPT VISIT, EST, LEVL IV, 30-39 MIN: ICD-10-PCS | Mod: S$GLB,,, | Performed by: INTERNAL MEDICINE

## 2023-05-05 NOTE — PROGRESS NOTES
Subjective:      Patient ID: Tiffany Adams is a 67 y.o. female.    Chief Complaint: Diabetes (6wk f/u )    Patient with h/o ESRD s/p Relative donor Renal Transplant in Dec/2019. Patient reports that she is doing well. Patient is on Immunosuppressants and is tolerating it well.     Patient with diabetes with last A1c of 6.9 worsen from 6.1.  Patient has CGM placed and the data is downloaded that suggest average blood sugar is 146 with standard deviation of 42.  Patient blood sugars are in good range 79% of the time and running high 17% of the time and very high 2% of the times.  Patient had some hypoglycemic episodes as well mostly between 1 and 3:00 p.m. patient has her lunch at 1:00 p.m. and takes 20 units of pre meal insulin.  Patient is taking Basaglar 48 units and NovoLog 10 units before breakfast and 20 units before lunch and dinner.  Patient denies any polyuria, polydipsia, numbness in hands or feet (patient is taking gabapentin with much help).  Patient blood pressures are under good control.     Patient was previously evaluated for right shoulder pain and was referred to orthopedic.  Patient received intra-articular injection which caused the blood sugar to go up.  Patient reports shoulder pain is better than before but still has some pain.  Orthopedic surgeon has recommended MRI but patient is not planning to do MRI or surgery at this time.     Patient has fibromyalgia and is taking Cymbalta with some help.  Patient reports worsening symptoms with worsening pain throughout the body.  Patient also complains of burning pain in bilateral feet and also some restless leg.  Patient is also on gabapentin and she was taking 300 mg twice a day but has started taking it 3 times a day with little help.     Review of Systems   Constitutional:  Negative for chills, diaphoresis, fever, malaise/fatigue and weight loss.   HENT:  Negative for congestion, ear pain, sinus pain, sore throat and tinnitus.    Eyes:  Negative  "for blurred vision and photophobia.   Respiratory:  Negative for cough, hemoptysis, shortness of breath and wheezing.    Cardiovascular:  Negative for chest pain, palpitations, orthopnea, leg swelling and PND.   Gastrointestinal:  Negative for abdominal pain, blood in stool, constipation, diarrhea, heartburn, melena, nausea and vomiting.   Genitourinary:  Negative for dysuria, frequency and urgency.   Musculoskeletal:  Negative for back pain, myalgias and neck pain.   Skin:  Negative for rash.   Neurological:  Negative for dizziness, tremors, seizures, loss of consciousness and weakness.   Endo/Heme/Allergies:  Negative for polydipsia.   Psychiatric/Behavioral:  Negative for depression and hallucinations. The patient does not have insomnia.      Objective:   /62 (BP Location: Right arm, Patient Position: Sitting, BP Method: Large (Manual))   Pulse 90   Temp 98 °F (36.7 °C) (Temporal)   Resp 16   Ht 5' 3" (1.6 m)   Wt 97.5 kg (215 lb)   SpO2 99%   BMI 38.09 kg/m²     Physical Exam  Constitutional:       General: She is not in acute distress.     Appearance: She is not diaphoretic.   Neck:      Thyroid: No thyromegaly.   Cardiovascular:      Rate and Rhythm: Normal rate and regular rhythm.      Heart sounds: Normal heart sounds. No murmur heard.  Pulmonary:      Effort: Pulmonary effort is normal. No respiratory distress.      Breath sounds: Normal breath sounds. No wheezing.   Abdominal:      General: Bowel sounds are normal. There is no distension.      Palpations: Abdomen is soft.      Tenderness: There is no abdominal tenderness.   Lymphadenopathy:      Cervical: No cervical adenopathy.   Neurological:      Mental Status: She is alert and oriented to person, place, and time.   Psychiatric:         Behavior: Behavior normal.         Thought Content: Thought content normal.         Judgment: Judgment normal.     Assessment:       ICD-10-CM ICD-9-CM   1. Diabetic polyneuropathy associated with type 2 " diabetes mellitus  E11.42 250.60     357.2   2. Immunosuppression  D84.9 279.9   3. Essential hypertension  I10 401.9   4. Fibromyalgia  M79.7 729.1       Plan:     Patient last A1c of 6.9 is at goal  Patient blood sugars seem better control but has hypoglycemic episodes mostly after lunch.  Will decreased pre meal insulin before lunch from 20 to 14 units  Will continue 10 units before breakfast 14 units before lunch and 20 units before dinner  Continue Basaglar 48 units  Patient blood pressure seem under good control.  Will continue medication  Patient has live donor renal transplant.  Last GFR of 54 was slightly low  Aggressive blood pressure and cholesterol control is recommended  Advised patient to continue immunosuppressive has prescribed by transplant nephrologist  Patient complains of Cymbalta and neuropathy symptoms are getting worse  Patient was taking gabapentin twice a day advised patient take it 3 times a day  If needed we can could consider increasing Cymbalta    Medication List with Changes/Refills   Current Medications    BASAGLAR KWIKPEN U-100 INSULIN GLARGINE 100 UNITS/ML SUBQ PEN    Inject 50 Units into the skin once daily.    BLOOD SUGAR DIAGNOSTIC STRP    Use to check blood glucose 3 times daily    DULOXETINE (CYMBALTA) 60 MG CAPSULE    TAKE 1 CAPSULE ONCE DAILY    GABAPENTIN (NEURONTIN) 300 MG CAPSULE    Take 1 capsule (300 mg total) by mouth 3 (three) times daily.    INSULIN ASPART U-100 (NOVOLOG FLEXPEN U-100 INSULIN) 100 UNIT/ML (3 ML) INPN PEN    INJECT SUBCUTANEOUSLY 10 UNITS BEFORE BREAKFAST AND 20 UNITS BEFORE LUNCH AND  DINNER.    LANCETS (ONETOUCH DELICA LANCETS) 33 GAUGE MISC    Use as directed to check blood glucose 3 times daily    METOPROLOL SUCCINATE (TOPROL-XL) 50 MG 24 HR TABLET    Take 1 tablet (50 mg total) by mouth once daily.    MYCOPHENOLATE (MYFORTIC) 180 MG TBEC    Take 180 mg by mouth once daily.    MYCOPHENOLATE (MYFORTIC) 360 MG TBEC    Take 360 mg by mouth once daily.  "Take 360 mg in the morning and 180 mg in the evening    PEN NEEDLE, DIABETIC (BD ULTRA-FINE MINI PEN NEEDLE) 31 GAUGE X 3/16" NDLE    1 each by Misc.(Non-Drug; Combo Route) route 4 (four) times daily.    PRENATAL VITAMIN 27 MG IRON- 0.8 MG TAB    Take 1 tablet by mouth once daily.    ROSUVASTATIN (CRESTOR) 10 MG TABLET    TAKE 1 TABLET ONCE DAILY    TACROLIMUS (PROGRAF) 1 MG CAP    Take 3 capsules by mouth once daily. Then 2 caps nightly        "

## 2023-05-19 DIAGNOSIS — E11.42 DIABETIC POLYNEUROPATHY ASSOCIATED WITH TYPE 2 DIABETES MELLITUS: ICD-10-CM

## 2023-05-19 RX ORDER — INSULIN GLARGINE 100 [IU]/ML
44 INJECTION, SOLUTION SUBCUTANEOUS DAILY
Qty: 39.6 ML | Refills: 3 | Status: SHIPPED | OUTPATIENT
Start: 2023-05-19 | End: 2024-03-19 | Stop reason: SDUPTHER

## 2023-05-19 NOTE — TELEPHONE ENCOUNTER
----- Message from Zandra Ruff sent at 5/19/2023  9:29 AM CDT -----  Contact: Patient  Type:  RX Refill Request    Who Called: Tiffany Adams   Refill or New Rx:refill  RX Name and Strength:BASAGLAR KWIKPEN U-100 INSULIN glargine 100 units/mL SubQ pen  How is the patient currently taking it? (ex. 1XDay):  Is this a 30 day or 90 day RX:  Preferred Pharmacy with phone number:  [facility] - [place] - [facility] AT [address] [facility]  [place] [id number]  Phone: [phone] Fax: [phone]  Local or Mail Order:mail order  Ordering Provider:Dr Salas   Would the patient rather a call back or a response via [facility]? Call back   Best Call Back Number:[phone]  Additional Information:

## 2023-05-27 DIAGNOSIS — E78.00 PURE HYPERCHOLESTEROLEMIA: ICD-10-CM

## 2023-05-29 RX ORDER — ROSUVASTATIN CALCIUM 10 MG/1
TABLET, COATED ORAL
Qty: 30 TABLET | Refills: 6 | Status: SHIPPED | OUTPATIENT
Start: 2023-05-29 | End: 2023-10-10

## 2023-06-14 ENCOUNTER — OFFICE VISIT (OUTPATIENT)
Dept: PRIMARY CARE CLINIC | Facility: CLINIC | Age: 68
End: 2023-06-14
Payer: MEDICARE

## 2023-06-14 VITALS
DIASTOLIC BLOOD PRESSURE: 67 MMHG | HEIGHT: 63 IN | TEMPERATURE: 98 F | HEART RATE: 93 BPM | BODY MASS INDEX: 37.81 KG/M2 | SYSTOLIC BLOOD PRESSURE: 116 MMHG | WEIGHT: 213.38 LBS | RESPIRATION RATE: 16 BRPM | OXYGEN SATURATION: 99 %

## 2023-06-14 DIAGNOSIS — E11.42 DIABETIC POLYNEUROPATHY ASSOCIATED WITH TYPE 2 DIABETES MELLITUS: Primary | ICD-10-CM

## 2023-06-14 DIAGNOSIS — R00.0 SINUS TACHYCARDIA: ICD-10-CM

## 2023-06-14 DIAGNOSIS — R10.2 SUPRAPUBIC PAIN: ICD-10-CM

## 2023-06-14 LAB
ABS NRBC COUNT: 0 THOU/UL (ref 0–0.01)
ABSOLUTE BASOPHIL: 0.1 10*3/UL (ref 0–0.3)
ABSOLUTE EOSINOPHIL: 0.3 10*3/UL (ref 0–0.6)
ABSOLUTE IMMATURE GRAN: 0.08 THOU/UL (ref 0–0.03)
ABSOLUTE LYMPHOCYTE: 0.5 10*3/UL (ref 1.2–4)
ABSOLUTE MONOCYTE: 0.9 10*3/UL (ref 0.1–0.8)
ALBUMIN SERPL BCP-MCNC: 3.9 G/DL (ref 3.4–5)
ALP SERPL-CCNC: 186 U/L (ref 45–117)
ALT SERPL W P-5'-P-CCNC: 36 U/L (ref 13–56)
ANION GAP SERPL CALC-SCNC: 8 MMOL/L (ref 3–11)
APPEARANCE, UA: ABNORMAL
AST SERPL-CCNC: 30 U/L (ref 15–37)
BACTERIA SPEC CULT: ABNORMAL /HPF
BASOPHILS NFR BLD: 0.9 % (ref 0–3)
BILIRUB SERPL-MCNC: 0.6 MG/DL (ref 0.2–1)
BILIRUB UR QL STRIP: NEGATIVE
BUN SERPL-MCNC: 19 MG/DL (ref 7–18)
BUN/CREAT SERPL: 17.75 RATIO
CALCIUM SERPL-MCNC: 9.4 MG/DL (ref 8.5–10.1)
CHLORIDE SERPL-SCNC: 100 MMOL/L (ref 98–107)
CO2 SERPL-SCNC: 29 MMOL/L (ref 21–32)
COLOR UR: YELLOW
CREAT SERPL-MCNC: 1.07 MG/DL (ref 0.55–1.02)
EOSINOPHIL NFR BLD: 4.7 % (ref 0–6)
ERYTHROCYTE [DISTWIDTH] IN BLOOD BY AUTOMATED COUNT: 13.8 % (ref 0–15.5)
ESTIMATED AVG GLUCOSE: 151 MG/DL
GFR ESTIMATION: 51
GLUCOSE (UA): NEGATIVE MG/DL
GLUCOSE SERPL-MCNC: 132 MG/DL (ref 74–106)
HBA1C MFR BLD: 6.4 % (ref 4.2–6.3)
HCT VFR BLD AUTO: 44.1 % (ref 37–47)
HGB BLD-MCNC: 14.1 G/DL (ref 12–16)
HGB UR QL STRIP: NEGATIVE
IMMATURE GRANULOCYTES: 1.2 % (ref 0–0.43)
KETONES UR QL STRIP: NEGATIVE MG/DL
LEUKOCYTE ESTERASE UR QL STRIP: 500 LEU/UL
LYMPHOCYTES NFR BLD: 6.6 % (ref 20–45)
MCH RBC QN AUTO: 30.3 PG (ref 27–32)
MCHC RBC AUTO-ENTMCNC: 32 % (ref 32–36)
MCV RBC AUTO: 94.6 FL (ref 80–99)
MONOCYTES NFR BLD: 13 % (ref 2–10)
MUCUS URINE: ABNORMAL /LPF
NEUTROPHILS # BLD AUTO: 5 10*3/UL (ref 1.4–7)
NEUTROPHILS NFR BLD: 73.6 % (ref 50–80)
NITRITE UR QL STRIP: NEGATIVE
NUCLEATED RED BLOOD CELLS: 0 % (ref 0–0.2)
PERIPHERAL SMEAR: NORMAL
PH UR STRIP: 5.5 PH (ref 5–8)
PLATELETS: 252 10*3/UL (ref 130–400)
PMV BLD AUTO: 10.1 FL (ref 9.2–12.2)
POTASSIUM SERPL-SCNC: 4.2 MMOL/L (ref 3.5–5.1)
PROT SERPL-MCNC: 7.3 G/DL (ref 6.4–8.2)
PROT UR QL STRIP: 10 MG/DL
RBC # BLD AUTO: 4.66 10*6/UL (ref 4.2–5.4)
RBC #/AREA URNS HPF: ABNORMAL /HPF (ref 0–2)
SERVICE COMMENT 03: ABNORMAL
SODIUM BLD-SCNC: 137 MMOL/L (ref 131–143)
SP GR UR STRIP: 1.02 (ref 1–1.03)
SQUAMOUS EPITHELIAL, UA: ABNORMAL /LPF
UROBILINOGEN UR STRIP-ACNC: NORMAL MG/DL
WBC # BLD: 6.8 10*3/UL (ref 4.5–10)
WBC #/AREA URNS HPF: ABNORMAL /HPF (ref 0–2)

## 2023-06-14 PROCEDURE — 99214 OFFICE O/P EST MOD 30 MIN: CPT | Mod: S$GLB,,, | Performed by: INTERNAL MEDICINE

## 2023-06-14 PROCEDURE — 99214 PR OFFICE/OUTPT VISIT, EST, LEVL IV, 30-39 MIN: ICD-10-PCS | Mod: S$GLB,,, | Performed by: INTERNAL MEDICINE

## 2023-06-14 NOTE — PROGRESS NOTES
Subjective:      Patient ID: Tiffany Adams is a 67 y.o. female.    Chief Complaint: Diabetes (1month f/u )     Patient with h/o ESRD s/p Relative donor Renal Transplant in Dec/2019. Patient reports that she is doing well. Patient is on Immunosuppressants and is tolerating it well.     Patient with diabetes with last A1c of 6.9 worsen from 6.1 but still at goal.  Patient has CGM and the data is downloaded.  Data suggest that average blood sugar is 163 with standard deviation of 50.  Patient blood sugars are in range 65% of the time and running high 29% of the time and very high 5% of the time.  There are few lows that are noted as well.  Patient is taking Basaglar 44 units and NovoLog 10 units before breakfast and 20 units before lunch and dinner.  Hypoglycemic episodes are noted after meals.  Patient denies any polyuria, polydipsia but has numbness in hands or feet that is controlled with gabapentin.  Patient blood pressures are on lower side the patient is off of all blood pressure medication.  Patient reports occasional dizziness with change of position.     Review of Systems   Constitutional:  Negative for chills, diaphoresis, fever, malaise/fatigue and weight loss.   HENT:  Negative for congestion, ear pain, sinus pain, sore throat and tinnitus.    Eyes:  Negative for blurred vision and photophobia.   Respiratory:  Negative for cough, hemoptysis, shortness of breath and wheezing.    Cardiovascular:  Negative for chest pain, palpitations, orthopnea, leg swelling and PND.   Gastrointestinal:  Negative for abdominal pain, blood in stool, constipation, diarrhea, heartburn, melena, nausea and vomiting.   Genitourinary:  Negative for dysuria, frequency and urgency.   Musculoskeletal:  Negative for back pain, myalgias and neck pain.   Skin:  Negative for rash.   Neurological:  Negative for dizziness, tremors, seizures, loss of consciousness and weakness.   Endo/Heme/Allergies:  Negative for polydipsia.  "  Psychiatric/Behavioral:  Negative for depression and hallucinations. The patient does not have insomnia.    Objective:   /67 (BP Location: Left arm, Patient Position: Sitting, BP Method: Large (Automatic))   Pulse 93   Temp 97.9 °F (36.6 °C) (Temporal)   Resp 16   Ht 5' 3" (1.6 m)   Wt 96.8 kg (213 lb 6.4 oz)   SpO2 99%   BMI 37.80 kg/m²     Physical Exam  Constitutional:       General: She is not in acute distress.     Appearance: She is not diaphoretic.   Neck:      Thyroid: No thyromegaly.   Cardiovascular:      Rate and Rhythm: Normal rate and regular rhythm.      Heart sounds: Normal heart sounds. No murmur heard.  Pulmonary:      Effort: Pulmonary effort is normal. No respiratory distress.      Breath sounds: Normal breath sounds. No wheezing.   Abdominal:      General: Bowel sounds are normal. There is no distension.      Palpations: Abdomen is soft.      Tenderness: There is no abdominal tenderness.   Lymphadenopathy:      Cervical: No cervical adenopathy.   Neurological:      Mental Status: She is alert and oriented to person, place, and time.   Psychiatric:         Behavior: Behavior normal.         Thought Content: Thought content normal.         Judgment: Judgment normal.     Assessment:       ICD-10-CM ICD-9-CM   1. Diabetic polyneuropathy associated with type 2 diabetes mellitus  E11.42 250.60     357.2   2. Suprapubic pain  R10.2 789.09   3. Sinus tachycardia  R00.0 427.89       Plan:     Patient has diabetes with last A1c of 6.9 is at goal  Patient has some hypoglycemic episode after meals.  Will decrease pre meal insulin to 8 units before breakfast + 18 units before lunch and dinner  Advised patient to decrease insulin further by 2 units if she is taking smaller than usual meal.  Will repeat labs  Patient blood pressures are on lower side, and has some suprapubic tenderness  Will check urinalysis  Patient has history of sinus tachycardia and was prescribed metoprolol  Patient is not " "sure if she is taking the medication  Patient has very fluctuating heart rate 60-93..  Will not change medication at this time  Advised patient to monitor blood pressures and heart rate..  Bring log on next visit  Close follow-up is needed  Will follow-up on GI referral for screening colonoscopy    Medication List with Changes/Refills   Current Medications    BASAGLAR KWIKPEN U-100 INSULIN GLARGINE 100 UNITS/ML SUBQ PEN    Inject 44 Units into the skin once daily.    BLOOD SUGAR DIAGNOSTIC STRP    Use to check blood glucose 3 times daily    DULOXETINE (CYMBALTA) 60 MG CAPSULE    TAKE 1 CAPSULE ONCE DAILY    GABAPENTIN (NEURONTIN) 300 MG CAPSULE    Take 1 capsule (300 mg total) by mouth 3 (three) times daily.    INSULIN ASPART U-100 (NOVOLOG FLEXPEN U-100 INSULIN) 100 UNIT/ML (3 ML) INPN PEN    INJECT SUBCUTANEOUSLY 10 UNITS BEFORE BREAKFAST AND 20 UNITS BEFORE LUNCH AND  DINNER.    LANCETS (ONETOUCH DELICA LANCETS) 33 GAUGE MISC    Use as directed to check blood glucose 3 times daily    METOPROLOL SUCCINATE (TOPROL-XL) 50 MG 24 HR TABLET    Take 1 tablet (50 mg total) by mouth once daily.    MYCOPHENOLATE (MYFORTIC) 180 MG TBEC    Take 180 mg by mouth once daily.    MYCOPHENOLATE (MYFORTIC) 360 MG TBEC    Take 360 mg by mouth once daily. Take 360 mg in the morning and 180 mg in the evening    PEN NEEDLE, DIABETIC (BD ULTRA-FINE MINI PEN NEEDLE) 31 GAUGE X 3/16" NDLE    1 each by Misc.(Non-Drug; Combo Route) route 4 (four) times daily.    PRENATAL VITAMIN 27 MG IRON- 0.8 MG TAB    Take 1 tablet by mouth once daily.    ROSUVASTATIN (CRESTOR) 10 MG TABLET    TAKE 1 TABLET ONCE DAILY    TACROLIMUS (PROGRAF) 1 MG CAP    Take 3 capsules by mouth once daily. Then 2 caps nightly        "

## 2023-06-15 DIAGNOSIS — N39.0 URINARY TRACT INFECTION WITHOUT HEMATURIA, SITE UNSPECIFIED: Primary | ICD-10-CM

## 2023-06-15 RX ORDER — CIPROFLOXACIN 500 MG/1
500 TABLET ORAL EVERY 12 HOURS
Qty: 14 TABLET | Refills: 0 | Status: SHIPPED | OUTPATIENT
Start: 2023-06-15 | End: 2023-08-23

## 2023-06-20 ENCOUNTER — TELEPHONE (OUTPATIENT)
Dept: PRIMARY CARE CLINIC | Facility: CLINIC | Age: 68
End: 2023-06-20
Payer: MEDICARE

## 2023-06-20 DIAGNOSIS — Z12.11 COLON CANCER SCREENING: Primary | ICD-10-CM

## 2023-06-20 NOTE — TELEPHONE ENCOUNTER
Patient states she would rather go to Dr. Sena instead of Dr. Baker. Patient needs new referral.   Patient notified of lab results.

## 2023-08-09 DIAGNOSIS — E11.42 DIABETIC POLYNEUROPATHY ASSOCIATED WITH TYPE 2 DIABETES MELLITUS: ICD-10-CM

## 2023-08-13 RX ORDER — INSULIN ASPART 100 [IU]/ML
INJECTION, SOLUTION INTRAVENOUS; SUBCUTANEOUS
Qty: 15 ML | Refills: 3 | Status: SHIPPED | OUTPATIENT
Start: 2023-08-13 | End: 2023-12-20 | Stop reason: SDUPTHER

## 2023-08-23 ENCOUNTER — OFFICE VISIT (OUTPATIENT)
Dept: PRIMARY CARE CLINIC | Facility: CLINIC | Age: 68
End: 2023-08-23
Payer: MEDICARE

## 2023-08-23 VITALS
HEIGHT: 63 IN | SYSTOLIC BLOOD PRESSURE: 110 MMHG | TEMPERATURE: 98 F | WEIGHT: 214 LBS | RESPIRATION RATE: 16 BRPM | HEART RATE: 92 BPM | OXYGEN SATURATION: 96 % | DIASTOLIC BLOOD PRESSURE: 77 MMHG | BODY MASS INDEX: 37.92 KG/M2

## 2023-08-23 DIAGNOSIS — E11.42 DIABETIC POLYNEUROPATHY ASSOCIATED WITH TYPE 2 DIABETES MELLITUS: Primary | ICD-10-CM

## 2023-08-23 DIAGNOSIS — I10 ESSENTIAL HYPERTENSION: ICD-10-CM

## 2023-08-23 DIAGNOSIS — R00.0 SINUS TACHYCARDIA: ICD-10-CM

## 2023-08-23 DIAGNOSIS — Z01.818 PRE-OP EVALUATION: ICD-10-CM

## 2023-08-23 PROCEDURE — 99214 PR OFFICE/OUTPT VISIT, EST, LEVL IV, 30-39 MIN: ICD-10-PCS | Mod: 25,S$GLB,, | Performed by: INTERNAL MEDICINE

## 2023-08-23 PROCEDURE — 99214 OFFICE O/P EST MOD 30 MIN: CPT | Mod: 25,S$GLB,, | Performed by: INTERNAL MEDICINE

## 2023-08-23 PROCEDURE — 95251 CONT GLUC MNTR ANALYSIS I&R: CPT | Mod: S$GLB,,, | Performed by: INTERNAL MEDICINE

## 2023-08-23 PROCEDURE — 95251 PR GLUCOSE MONITOR, 72 HOUR, PHYS INTERP: ICD-10-PCS | Mod: S$GLB,,, | Performed by: INTERNAL MEDICINE

## 2023-08-23 NOTE — PROGRESS NOTES
Subjective:      Patient ID: Tiffany Adams is a 67 y.o. female.    Chief Complaint: Pre-op Exam (Pre op clearance for Dr. Waterman, Rt shoulder scope, RCR)    HPI:  Patient with h/o ESRD s/p Relative donor Renal Transplant in Dec/2019. Patient reports that she is doing well. Patient is on Immunosuppressants and is tolerating it well.     Patient with diabetes with last A1c of 6.4 improved from 6.9..  Patient has CGM the data is downloaded and the data suggest that average blood sugar is 185 with standard deviation of 56.  Patient blood sugars are in range 55% of the time running high 30% of the time and very high 15% of the time.  Also noticed that blood sugar tend to run high after breakfast.  Patient is taking Basaglar 44 units and NovoLog 10 units before breakfast and 20 units before lunch and dinner.  Patient denies any hypoglycemic episode, no polyuria polydipsia but has some numbness in hands and feet and is taking gabapentin with much help    Patient is planning to have right shoulder arthroscopy for preop clearance.  Patient denies any chest pain shortness some breath, ankle swelling.  Patient is able to walk more than 2 blocks without any cardiac symptoms.  Patient is able to climb a flight of stair without any cardiac symptoms as well.  Patient is also planning to have colonoscopy    Review of Systems   Constitutional:  Negative for chills, diaphoresis, fever, malaise/fatigue and weight loss.   HENT:  Negative for congestion, ear pain, sinus pain, sore throat and tinnitus.    Eyes:  Negative for blurred vision and photophobia.   Respiratory:  Negative for cough, hemoptysis, shortness of breath and wheezing.    Cardiovascular:  Negative for chest pain, palpitations, orthopnea, leg swelling and PND.   Gastrointestinal:  Negative for abdominal pain, blood in stool, constipation, diarrhea, heartburn, melena, nausea and vomiting.   Genitourinary:  Negative for dysuria, frequency and urgency.  "  Musculoskeletal:  Negative for back pain, myalgias and neck pain.   Skin:  Negative for rash.   Neurological:  Positive for tingling. Negative for dizziness, tremors, seizures, loss of consciousness and weakness.   Endo/Heme/Allergies:  Negative for polydipsia.   Psychiatric/Behavioral:  Negative for depression and hallucinations. The patient does not have insomnia.      Objective:   /77 (BP Location: Right arm, Patient Position: Sitting, BP Method: Medium (Automatic))   Pulse 92   Temp 97.7 °F (36.5 °C) (Temporal)   Resp 16   Ht 5' 3" (1.6 m)   Wt 97.1 kg (214 lb)   SpO2 96%   BMI 37.91 kg/m²     Physical Exam:  Patient not examined  Assessment:       ICD-10-CM ICD-9-CM   1. Diabetic polyneuropathy associated with type 2 diabetes mellitus  E11.42 250.60     357.2   2. Pre-op evaluation  Z01.818 V72.84       Plan:     Patient has diabetes and blood sugars seem under okay control.  Patient last A1c of 6.4 is at goal.  Patient blood sugars seem to run high after breakfast.  Will increase pre breakfast insulin to 12 units  Will continue 44 unit of insulin before surgery  Patient EKG showed normal sinus rhythm with sinus arrhythmia  Patient heart rate is on higher side and is prescribed metoprolol but not sure if she is taking it  Advised patient to call my office if she is not taking metoprolol  Patient has previous history of sinus tachycardia for which she is prescribed metoprolol  Patient is a low cardiac risk for a moderate risk noncardiac surgery  Will review labs + chest x-ray  Advised patient to take 22 units of Basaglar on the day of surgery  Advised patient to discuss with transplant team if anything needs to be changed before surgery        Medication List with Changes/Refills   Current Medications    BASAGLAR KWIKPEN U-100 INSULIN GLARGINE 100 UNITS/ML SUBQ PEN    Inject 44 Units into the skin once daily.    BLOOD SUGAR DIAGNOSTIC STRP    Use to check blood glucose 3 times daily    DULOXETINE " "(CYMBALTA) 60 MG CAPSULE    TAKE 1 CAPSULE ONCE DAILY    GABAPENTIN (NEURONTIN) 300 MG CAPSULE    Take 1 capsule (300 mg total) by mouth 3 (three) times daily.    INSULIN ASPART U-100 (NOVOLOG FLEXPEN U-100 INSULIN) 100 UNIT/ML (3 ML) INPN PEN    INJECT SUBCUTANEOUSLY 8  UNITS BEFORE BREAKFAST AND 18UNITS BEFORE LUNCH AND  DINNER.    LANCETS (ONETOUCH DELICA LANCETS) 33 GAUGE MISC    Use as directed to check blood glucose 3 times daily    METOPROLOL SUCCINATE (TOPROL-XL) 50 MG 24 HR TABLET    Take 1 tablet (50 mg total) by mouth once daily.    MYCOPHENOLATE (MYFORTIC) 180 MG TBEC    Take 180 mg by mouth once daily.    MYCOPHENOLATE (MYFORTIC) 360 MG TBEC    Take 360 mg by mouth once daily. Take 360 mg in the morning and 180 mg in the evening    PEN NEEDLE, DIABETIC (BD ULTRA-FINE MINI PEN NEEDLE) 31 GAUGE X 3/16" NDLE    1 each by Misc.(Non-Drug; Combo Route) route 4 (four) times daily.    PRENATAL VITAMIN 27 MG IRON- 0.8 MG TAB    Take 1 tablet by mouth once daily.    ROSUVASTATIN (CRESTOR) 10 MG TABLET    TAKE 1 TABLET ONCE DAILY    TACROLIMUS (PROGRAF) 1 MG CAP    Take 3 capsules by mouth once daily. Then 2 caps nightly   Discontinued Medications    CIPROFLOXACIN HCL (CIPRO) 500 MG TABLET    Take 1 tablet (500 mg total) by mouth every 12 (twelve) hours.        "

## 2023-08-25 ENCOUNTER — CLINICAL SUPPORT (OUTPATIENT)
Dept: OBSTETRICS AND GYNECOLOGY | Facility: CLINIC | Age: 68
End: 2023-08-25
Payer: MEDICARE

## 2023-08-25 DIAGNOSIS — R00.0 SINUS TACHYCARDIA: ICD-10-CM

## 2023-08-25 DIAGNOSIS — Z01.89 ROUTINE LAB DRAW: Primary | ICD-10-CM

## 2023-08-25 LAB
ABS NRBC COUNT: 0 THOU/UL (ref 0–0.01)
ABSOLUTE BASOPHIL: 0.1 10*3/UL (ref 0–0.3)
ABSOLUTE EOSINOPHIL: 0.3 10*3/UL (ref 0–0.6)
ABSOLUTE IMMATURE GRAN: 0.09 THOU/UL (ref 0–0.03)
ABSOLUTE LYMPHOCYTE: 0.3 10*3/UL (ref 1.2–4)
ABSOLUTE MONOCYTE: 0.7 10*3/UL (ref 0.1–0.8)
ALBUMIN SERPL BCP-MCNC: 3.5 G/DL (ref 3.4–5)
ALP SERPL-CCNC: 208 U/L (ref 45–117)
ALT SERPL W P-5'-P-CCNC: 40 U/L (ref 13–56)
ANION GAP SERPL CALC-SCNC: 4 MMOL/L (ref 3–11)
AST SERPL-CCNC: 32 U/L (ref 15–37)
BASOPHILS NFR BLD: 0.9 % (ref 0–3)
BILIRUB SERPL-MCNC: 0.7 MG/DL (ref 0.2–1)
BUN SERPL-MCNC: 21 MG/DL (ref 7–18)
BUN/CREAT SERPL: 18.26 RATIO
CALCIUM SERPL-MCNC: 8.9 MG/DL (ref 8.5–10.1)
CHLORIDE SERPL-SCNC: 105 MMOL/L (ref 98–107)
CHOLEST SERPL-MSCNC: 143 MG/DL
CO2 SERPL-SCNC: 29 MMOL/L (ref 21–32)
CREAT SERPL-MCNC: 1.15 MG/DL (ref 0.55–1.02)
EOSINOPHIL NFR BLD: 4.4 % (ref 0–6)
ERYTHROCYTE [DISTWIDTH] IN BLOOD BY AUTOMATED COUNT: 14.6 % (ref 0–15.5)
GFR ESTIMATION: 47
GLUCOSE SERPL-MCNC: 143 MG/DL (ref 74–106)
HCT VFR BLD AUTO: 42.5 % (ref 37–47)
HDLC SERPL-MCNC: 39 MG/DL
HGB BLD-MCNC: 14.1 G/DL (ref 12–16)
IMMATURE GRANULOCYTES: 1.4 % (ref 0–0.43)
LDLC SERPL CALC-MCNC: 78.2 MG/DL
LYMPHOCYTES NFR BLD: 4.9 % (ref 20–45)
MCH RBC QN AUTO: 32.1 PG (ref 27–32)
MCHC RBC AUTO-ENTMCNC: 33.2 % (ref 32–36)
MCV RBC AUTO: 96.8 FL (ref 80–99)
MONOCYTES NFR BLD: 10.7 % (ref 2–10)
NEUTROPHILS # BLD AUTO: 4.9 10*3/UL (ref 1.4–7)
NEUTROPHILS NFR BLD: 77.7 % (ref 50–80)
NUCLEATED RED BLOOD CELLS: 0 % (ref 0–0.2)
PERIPHERAL SMEAR: NORMAL
PLATELETS: 213 10*3/UL (ref 130–400)
PMV BLD AUTO: 10.2 FL (ref 9.2–12.2)
POTASSIUM SERPL-SCNC: 5.1 MMOL/L (ref 3.5–5.1)
PROT SERPL-MCNC: 6.7 G/DL (ref 6.4–8.2)
RBC # BLD AUTO: 4.39 10*6/UL (ref 4.2–5.4)
SODIUM BLD-SCNC: 138 MMOL/L (ref 131–143)
TRIGL SERPL-MCNC: 129 MG/DL (ref 0–149)
TSH SERPL DL<=0.005 MIU/L-ACNC: 1.02 UIU/ML (ref 0.36–3.74)
VLDL CHOLESTEROL: 26 MG/DL
WBC # BLD: 6.4 10*3/UL (ref 4.5–10)

## 2023-08-25 PROCEDURE — 36415 PR COLLECTION VENOUS BLOOD,VENIPUNCTURE: ICD-10-PCS | Mod: S$GLB,,, | Performed by: INTERNAL MEDICINE

## 2023-08-25 PROCEDURE — 36415 COLL VENOUS BLD VENIPUNCTURE: CPT | Mod: S$GLB,,, | Performed by: INTERNAL MEDICINE

## 2023-08-25 RX ORDER — METOPROLOL SUCCINATE 50 MG/1
50 TABLET, EXTENDED RELEASE ORAL DAILY
Qty: 90 TABLET | Refills: 3 | Status: SHIPPED | OUTPATIENT
Start: 2023-08-25 | End: 2024-08-24

## 2023-08-27 LAB
CREAT 24H UR-MRATE: NORMAL MG/D (ref 500–1400)
CREATININE, RANDOM URINE: 118 MG/DL
Lab: NORMAL HR
Lab: NORMAL MG/D (ref 2–30)
MICROALBUMIN 24H UR-MRATE: 0.7 MG/DL
MICROALBUMIN EXCRETION RATE: NORMAL UG/MIN (ref 0–20)
MICROALBUMIN/CREATININE RATIO: 6 MG/G (ref 0–30)
VOLUME: NORMAL ML

## 2023-09-07 LAB — CRC RECOMMENDATION EXT: NORMAL

## 2023-09-08 ENCOUNTER — PATIENT OUTREACH (OUTPATIENT)
Dept: ADMINISTRATIVE | Facility: HOSPITAL | Age: 68
End: 2023-09-08
Payer: MEDICARE

## 2023-09-22 ENCOUNTER — TELEPHONE (OUTPATIENT)
Dept: PRIMARY CARE CLINIC | Facility: CLINIC | Age: 68
End: 2023-09-22
Payer: MEDICARE

## 2023-09-22 NOTE — TELEPHONE ENCOUNTER
S/w patient and she states she is not sure who she gets her DexCom from. Advised I will contact DexCom rep and send an order for refill.

## 2023-09-22 NOTE — TELEPHONE ENCOUNTER
----- Message from Ines Packer sent at 9/22/2023 12:23 PM CDT -----  Contact: self  Type:  Diabetic/Medical Supplies Request    Name of Caller: Tiffany Adams   What supplies are needed: Needs the dexcomm 7 sensors  What is the brand of the supplies: Dexcomm  Refill or New Rx: NEW RX  If checking glucose, how many times do they check it?: It's automatic  Who prescribed the original supplies:  [employer]    Pharmacy/Company Name, Phone #, Location:   [facility] - [place] - [facility] AT [address] to [facility]  [place] [address]  Phone: [phone] Fax: [phone]    Requesting a Call Back?: call back  Would the patient rather a call back or a response via [facility]?  [phone]

## 2023-10-10 ENCOUNTER — OFFICE VISIT (OUTPATIENT)
Dept: PRIMARY CARE CLINIC | Facility: CLINIC | Age: 68
End: 2023-10-10
Payer: MEDICARE

## 2023-10-10 VITALS
SYSTOLIC BLOOD PRESSURE: 105 MMHG | TEMPERATURE: 97 F | WEIGHT: 215.19 LBS | DIASTOLIC BLOOD PRESSURE: 62 MMHG | OXYGEN SATURATION: 97 % | HEIGHT: 63 IN | RESPIRATION RATE: 18 BRPM | BODY MASS INDEX: 38.13 KG/M2 | HEART RATE: 70 BPM

## 2023-10-10 DIAGNOSIS — E78.2 MIXED HYPERLIPIDEMIA: ICD-10-CM

## 2023-10-10 DIAGNOSIS — R00.0 SINUS TACHYCARDIA: ICD-10-CM

## 2023-10-10 DIAGNOSIS — Z23 FLU VACCINE NEED: Primary | ICD-10-CM

## 2023-10-10 DIAGNOSIS — E11.42 DIABETIC POLYNEUROPATHY ASSOCIATED WITH TYPE 2 DIABETES MELLITUS: ICD-10-CM

## 2023-10-10 PROCEDURE — G0008 ADMIN INFLUENZA VIRUS VAC: HCPCS | Mod: S$GLB,,, | Performed by: INTERNAL MEDICINE

## 2023-10-10 PROCEDURE — 90694 FLU VACCINE - QUADRIVALENT - ADJUVANTED: ICD-10-PCS | Mod: S$GLB,,, | Performed by: INTERNAL MEDICINE

## 2023-10-10 PROCEDURE — 99214 PR OFFICE/OUTPT VISIT, EST, LEVL IV, 30-39 MIN: ICD-10-PCS | Mod: S$GLB,,, | Performed by: INTERNAL MEDICINE

## 2023-10-10 PROCEDURE — G0008 FLU VACCINE - QUADRIVALENT - ADJUVANTED: ICD-10-PCS | Mod: S$GLB,,, | Performed by: INTERNAL MEDICINE

## 2023-10-10 PROCEDURE — 99214 OFFICE O/P EST MOD 30 MIN: CPT | Mod: S$GLB,,, | Performed by: INTERNAL MEDICINE

## 2023-10-10 PROCEDURE — 90694 VACC AIIV4 NO PRSRV 0.5ML IM: CPT | Mod: S$GLB,,, | Performed by: INTERNAL MEDICINE

## 2023-10-10 RX ORDER — ROSUVASTATIN CALCIUM 20 MG/1
20 TABLET, COATED ORAL DAILY
Qty: 90 TABLET | Refills: 3 | Status: SHIPPED | OUTPATIENT
Start: 2023-10-10 | End: 2024-03-19 | Stop reason: SDUPTHER

## 2023-10-10 RX ORDER — HYDROCODONE BITARTRATE AND ACETAMINOPHEN 10; 325 MG/1; MG/1
1 TABLET ORAL EVERY 8 HOURS PRN
COMMUNITY
Start: 2023-09-21

## 2023-10-10 NOTE — PROGRESS NOTES
"  Subjective:      Patient ID: Tiffany Adams is a 67 y.o. female.    Chief Complaint: Follow-up    : Patient with h/o ESRD s/p Relative donor Renal Transplant in Dec/2019. Patient reports that she is doing well. Patient is on Immunosuppressants and is tolerating it well.      Patient with diabetes with last A1c of 6.4 markedly improved from 6.9.  Patient is on CGM but needs refill and is off of the device for about a week.  Patient reports home blood sugars are under good control and denies any hypoglycemic episodes.  Patient is taking Basaglar 44 units and NovoLog 12 units before breakfast 16 for lunch and 18 for dinner.     Patient recently had rotator cuff surgery/repair by Dr Guzman.  The procedure well.  Patient is given hydrocodone/APAP but is not taking the medication.     Review of Systems   Constitutional:  Negative for chills, diaphoresis, fever, malaise/fatigue and weight loss.   HENT:  Negative for congestion, ear pain, sinus pain, sore throat and tinnitus.    Eyes:  Negative for blurred vision and photophobia.   Respiratory:  Negative for cough, hemoptysis, shortness of breath and wheezing.    Cardiovascular:  Negative for chest pain, palpitations, orthopnea, leg swelling and PND.   Gastrointestinal:  Negative for abdominal pain, blood in stool, constipation, diarrhea, heartburn, melena, nausea and vomiting.   Genitourinary:  Negative for dysuria, frequency and urgency.   Musculoskeletal:  Negative for back pain, myalgias and neck pain.   Skin:  Negative for rash.   Neurological:  Negative for dizziness, tremors, seizures, loss of consciousness and weakness.   Endo/Heme/Allergies:  Negative for polydipsia.   Psychiatric/Behavioral:  Negative for depression and hallucinations. The patient does not have insomnia.      Objective:   /62 (BP Location: Left forearm, Patient Position: Sitting, BP Method: Small (Automatic))   Pulse 70   Temp 96.7 °F (35.9 °C) (Temporal)   Resp 18   Ht 5' 3" (1.6 m) "   Wt 97.6 kg (215 lb 3.2 oz)   SpO2 97%   BMI 38.12 kg/m²     Physical Exam:  Patient not examined  Assessment:       ICD-10-CM ICD-9-CM   1. Diabetic polyneuropathy associated with type 2 diabetes mellitus  E11.42 250.60     357.2   2. Sinus tachycardia  R00.0 427.89   3. Mixed hyperlipidemia  E78.2 272.2       Plan:     Patient with diabetes with last A1c of 6.4 is at goal.  Will continue same medication  Will repeat A1c and CMP  Patient has sinus tachycardia and is on metoprolol and heart rate seem under good control  Will continue same medications for now  Patient last LDL of 88 is slightly high target LDL for patient will be less than 70  Will increase rosuvastatin to 20 mg  Will repeat labs on return  Will administer flu vaccine    Medication List with Changes/Refills   Current Medications    BASAGLAR KWIKPEN U-100 INSULIN GLARGINE 100 UNITS/ML SUBQ PEN    Inject 44 Units into the skin once daily.    BLOOD SUGAR DIAGNOSTIC STRP    Use to check blood glucose 3 times daily    DULOXETINE (CYMBALTA) 60 MG CAPSULE    TAKE 1 CAPSULE ONCE DAILY    GABAPENTIN (NEURONTIN) 300 MG CAPSULE    Take 1 capsule (300 mg total) by mouth 3 (three) times daily.    HYDROCODONE-ACETAMINOPHEN (NORCO)  MG PER TABLET    Take 1 tablet by mouth every 8 (eight) hours as needed.    INSULIN ASPART U-100 (NOVOLOG FLEXPEN U-100 INSULIN) 100 UNIT/ML (3 ML) INPN PEN    INJECT SUBCUTANEOUSLY 8  UNITS BEFORE BREAKFAST AND 18UNITS BEFORE LUNCH AND  DINNER.    LANCETS (ONETOUCH DELICA LANCETS) 33 GAUGE MISC    Use as directed to check blood glucose 3 times daily    METOPROLOL SUCCINATE (TOPROL-XL) 50 MG 24 HR TABLET    Take 1 tablet (50 mg total) by mouth once daily.    MYCOPHENOLATE (MYFORTIC) 180 MG TBEC    Take 180 mg by mouth once daily.    MYCOPHENOLATE (MYFORTIC) 360 MG TBEC    Take 360 mg by mouth once daily. Take 360 mg in the morning and 180 mg in the evening    PEN NEEDLE, DIABETIC (BD ULTRA-FINE MINI PEN NEEDLE) 31 GAUGE X  "3/16" NDLE    1 each by Misc.(Non-Drug; Combo Route) route 4 (four) times daily.    PRENATAL VITAMIN 27 MG IRON- 0.8 MG TAB    Take 1 tablet by mouth once daily.    ROSUVASTATIN (CRESTOR) 10 MG TABLET    TAKE 1 TABLET ONCE DAILY    TACROLIMUS (PROGRAF) 1 MG CAP    Take 3 capsules by mouth once daily. Then 2 caps nightly        "

## 2023-10-11 ENCOUNTER — CLINICAL SUPPORT (OUTPATIENT)
Dept: OBSTETRICS AND GYNECOLOGY | Facility: CLINIC | Age: 68
End: 2023-10-11
Payer: MEDICARE

## 2023-10-11 ENCOUNTER — TELEPHONE (OUTPATIENT)
Dept: PRIMARY CARE CLINIC | Facility: CLINIC | Age: 68
End: 2023-10-11
Payer: MEDICARE

## 2023-10-11 DIAGNOSIS — Z01.89 ROUTINE LAB DRAW: Primary | ICD-10-CM

## 2023-10-11 DIAGNOSIS — E11.42 DIABETIC POLYNEUROPATHY ASSOCIATED WITH TYPE 2 DIABETES MELLITUS: ICD-10-CM

## 2023-10-11 LAB
ABS NRBC COUNT: 0 THOU/UL (ref 0–0.01)
ABSOLUTE BASOPHIL: 0.1 10*3/UL (ref 0–0.3)
ABSOLUTE EOSINOPHIL: 0.3 10*3/UL (ref 0–0.6)
ABSOLUTE IMMATURE GRAN: 0.05 THOU/UL (ref 0–0.03)
ABSOLUTE LYMPHOCYTE: 0.4 10*3/UL (ref 1.2–4)
ABSOLUTE MONOCYTE: 0.8 10*3/UL (ref 0.1–0.8)
ALBUMIN SERPL BCP-MCNC: 3.6 G/DL (ref 3.4–5)
ALP SERPL-CCNC: 236 U/L (ref 45–117)
ALT SERPL W P-5'-P-CCNC: 57 U/L (ref 13–56)
ANION GAP SERPL CALC-SCNC: 5 MMOL/L (ref 3–11)
AST SERPL-CCNC: 38 U/L (ref 15–37)
BASOPHILS NFR BLD: 0.9 % (ref 0–3)
BILIRUB SERPL-MCNC: 0.6 MG/DL (ref 0.2–1)
BUN SERPL-MCNC: 23 MG/DL (ref 7–18)
BUN/CREAT SERPL: 20.9 RATIO
CALCIUM SERPL-MCNC: 8.9 MG/DL (ref 8.5–10.1)
CHLORIDE SERPL-SCNC: 105 MMOL/L (ref 98–107)
CHOLEST SERPL-MSCNC: 135 MG/DL
CO2 SERPL-SCNC: 28 MMOL/L (ref 21–32)
CREAT SERPL-MCNC: 1.1 MG/DL (ref 0.55–1.02)
EOSINOPHIL NFR BLD: 4.6 % (ref 0–6)
ERYTHROCYTE [DISTWIDTH] IN BLOOD BY AUTOMATED COUNT: 13.7 % (ref 0–15.5)
ESTIMATED AVG GLUCOSE: 147 MG/DL
GFR ESTIMATION: 49
GLUCOSE SERPL-MCNC: 89 MG/DL (ref 74–106)
HBA1C MFR BLD: 6.3 % (ref 4.2–6.3)
HCT VFR BLD AUTO: 42.8 % (ref 37–47)
HDLC SERPL-MCNC: 45 MG/DL
HGB BLD-MCNC: 14.2 G/DL (ref 12–16)
IMMATURE GRANULOCYTES: 0.8 % (ref 0–0.43)
LDLC SERPL CALC-MCNC: 59.6 MG/DL
LYMPHOCYTES NFR BLD: 6.2 % (ref 20–45)
MCH RBC QN AUTO: 31.8 PG (ref 27–32)
MCHC RBC AUTO-ENTMCNC: 33.2 % (ref 32–36)
MCV RBC AUTO: 95.7 FL (ref 80–99)
MONOCYTES NFR BLD: 11.7 % (ref 2–10)
NEUTROPHILS # BLD AUTO: 4.9 10*3/UL (ref 1.4–7)
NEUTROPHILS NFR BLD: 75.8 % (ref 50–80)
NUCLEATED RED BLOOD CELLS: 0 % (ref 0–0.2)
PERIPHERAL SMEAR: NORMAL
PLATELETS: 251 10*3/UL (ref 130–400)
PMV BLD AUTO: 9.8 FL (ref 9.2–12.2)
POTASSIUM SERPL-SCNC: 4.6 MMOL/L (ref 3.5–5.1)
PROT SERPL-MCNC: 6.9 G/DL (ref 6.4–8.2)
RBC # BLD AUTO: 4.47 10*6/UL (ref 4.2–5.4)
SODIUM BLD-SCNC: 138 MMOL/L (ref 131–143)
TRIGL SERPL-MCNC: 152 MG/DL (ref 0–149)
TSH SERPL DL<=0.005 MIU/L-ACNC: 1.39 UIU/ML (ref 0.36–3.74)
VLDL CHOLESTEROL: 30 MG/DL
WBC # BLD: 6.5 10*3/UL (ref 4.5–10)

## 2023-10-11 PROCEDURE — 36415 PR COLLECTION VENOUS BLOOD,VENIPUNCTURE: ICD-10-PCS | Mod: S$GLB,,, | Performed by: INTERNAL MEDICINE

## 2023-10-11 PROCEDURE — 36415 COLL VENOUS BLD VENIPUNCTURE: CPT | Mod: S$GLB,,, | Performed by: INTERNAL MEDICINE

## 2023-10-11 NOTE — TELEPHONE ENCOUNTER
----- Message from Abdi Morton MA sent at 10/11/2023  8:53 AM CDT -----  Contact: Pt    ----- Message -----  From: Alexis Hilario  Sent: 10/11/2023   8:52 AM CDT  To: Maritza Hernandez Staff    Type:  Patient Returning Call    Who Called: pt   Who Left Message for Patient nurse Gemma Bowden   Does the patient know what this is regarding?: test results   Would the patient rather a call back or a response via [facility]?  phone  Best Call Back Number: [phone]  Additional Information:

## 2023-10-31 ENCOUNTER — TELEPHONE (OUTPATIENT)
Dept: PRIMARY CARE CLINIC | Facility: CLINIC | Age: 68
End: 2023-10-31

## 2023-10-31 NOTE — TELEPHONE ENCOUNTER
----- Message from Katrina Trinidad sent at 10/31/2023  2:55 PM CDT -----  Type:  Needs Medical Advice    Who Called: Tiffany Adams    Symptoms (please be specific): -   How long has patient had these symptoms:  -  Pharmacy name and phone #:  -  Would the patient rather a call back or a response via [facility]?    Best Call Back Number: [phone]    Additional Information:  pt says she is out of the sensors for her dexcom device for almost 1 mth, please call

## 2023-11-17 DIAGNOSIS — E11.42 DIABETIC POLYNEUROPATHY ASSOCIATED WITH TYPE 2 DIABETES MELLITUS: ICD-10-CM

## 2023-11-17 RX ORDER — GABAPENTIN 300 MG/1
300 CAPSULE ORAL 3 TIMES DAILY
Qty: 90 CAPSULE | Refills: 11 | Status: SHIPPED | OUTPATIENT
Start: 2023-11-17 | End: 2024-11-16

## 2023-12-20 DIAGNOSIS — E11.42 DIABETIC POLYNEUROPATHY ASSOCIATED WITH TYPE 2 DIABETES MELLITUS: ICD-10-CM

## 2023-12-20 RX ORDER — INSULIN ASPART 100 [IU]/ML
INJECTION, SOLUTION INTRAVENOUS; SUBCUTANEOUS
Qty: 15 ML | Refills: 3 | Status: SHIPPED | OUTPATIENT
Start: 2023-12-20 | End: 2024-03-14

## 2023-12-20 NOTE — TELEPHONE ENCOUNTER
----- Message from Frances Mercer sent at 12/20/2023 11:17 AM CST -----  Contact: self  Type:  RX Refill Request    Who Called: Tiffany Adams    Refill or New Rx:refill  RX Name and Strength:insulin aspart U-100 (NOVOLOG FLEXPEN U-100 INSULIN) 100 unit/mL (3 mL) InPn pen 15 mL 3 8/13/2023   Sig: INJECT SUBCUTANEOUSLY 8  UNITS BEFORE BREAKFAST AND 18UNITS BEFORE LUNCH AND  DINNER.  How is the patient currently taking it? (ex. 1XDay):3 times daily  Is this a 30 day or 90 day RX:see medication  Preferred Pharmacy with phone number:  [facility] - [place] - [facility] AT [address] to [facility]  [place] [address]  Phone: [phone] Fax: [phone]  Local or Mail Order:mail  Ordering Provider:Dr Salas  Would the patient rather a call back or a response via [facility]? call  Best Call Back Number:[phone]  Additional Information: na

## 2024-01-04 DIAGNOSIS — F32.89 OTHER DEPRESSION: ICD-10-CM

## 2024-01-04 DIAGNOSIS — M79.7 FIBROMYALGIA: ICD-10-CM

## 2024-01-04 RX ORDER — DULOXETIN HYDROCHLORIDE 60 MG/1
60 CAPSULE, DELAYED RELEASE ORAL DAILY
Qty: 90 CAPSULE | Refills: 3 | Status: SHIPPED | OUTPATIENT
Start: 2024-01-04 | End: 2024-01-23 | Stop reason: SDUPTHER

## 2024-01-23 DIAGNOSIS — F32.89 OTHER DEPRESSION: ICD-10-CM

## 2024-01-23 DIAGNOSIS — M79.7 FIBROMYALGIA: ICD-10-CM

## 2024-01-23 RX ORDER — DULOXETIN HYDROCHLORIDE 60 MG/1
60 CAPSULE, DELAYED RELEASE ORAL DAILY
Qty: 90 CAPSULE | Refills: 3 | Status: SHIPPED | OUTPATIENT
Start: 2024-01-23

## 2024-01-23 NOTE — TELEPHONE ENCOUNTER
----- Message from Zandra Ruff sent at 1/23/2024  8:45 AM CST -----  Contact: Patient  Type:  RX Refill Request    Who Called: Tiffany Adams   Refill or New Rx:refill  RX Name and Strength:DULoxetine (CYMBALTA) 60 MG capsule  How is the patient currently taking it? (ex. 1XDay):Take 1 capsule (60 mg total) by mouth once mitch  Is this a 30 day or 90 day RX:90  Preferred Pharmacy with phone number:  [facility] - [place] - [facility] AT [address] to [facility]  [place] [address]  Phone: [phone] Fax: [phone]  Local or Mail Order:local  Ordering Provider:Dr Salas   Would the patient rather a call back or a response via [facility]? Call back   Best Call Back Number:[phone]  Additional Information:

## 2024-03-14 ENCOUNTER — TELEPHONE (OUTPATIENT)
Dept: PRIMARY CARE CLINIC | Facility: CLINIC | Age: 69
End: 2024-03-14
Payer: MEDICARE

## 2024-03-14 DIAGNOSIS — E11.42 DIABETIC POLYNEUROPATHY ASSOCIATED WITH TYPE 2 DIABETES MELLITUS: Primary | ICD-10-CM

## 2024-03-14 RX ORDER — INSULIN ASPART INJECTION 100 [IU]/ML
INJECTION, SOLUTION SUBCUTANEOUS
Qty: 5 PEN | Refills: 11 | Status: SHIPPED | OUTPATIENT
Start: 2024-03-14

## 2024-03-14 NOTE — TELEPHONE ENCOUNTER
Per pt's ins--[facility]- insulin Aspart is not a covered med on her ins--drug that is covered is Fiasp Flex Touch pen 3ml/100u--PHARMACY STATED NO OTHER DRUG IS COVERED

## 2024-03-14 NOTE — TELEPHONE ENCOUNTER
----- Message from Amber Le MA sent at 3/12/2024  4:11 PM CDT -----  Contact: Patient    ----- Message -----  From: Libby Leiva  Sent: 3/12/2024  11:30 AM CDT  To: Maritza Hernandez Staff    Call Type: Staff Message    Who Called: Gwen User[employer] Live Chat  Call back number: [phone]  Nature of the call: insulin aspart U-100 (NOVOLOG FLEXPEN U-100 INSULIN) 100 unit/mL (3 mL) InPn pen (not covered by insurance)  Additional information: URGENT RESPONSE:Staff states this the last notices    Reference #[id number]  Alternative Rx: Fiasp FLEXTOUCH Pen 3 mL 5 strip 100 mL (PRINCE NORDISK)

## 2024-03-19 DIAGNOSIS — E78.2 MIXED HYPERLIPIDEMIA: ICD-10-CM

## 2024-03-19 DIAGNOSIS — E11.42 DIABETIC POLYNEUROPATHY ASSOCIATED WITH TYPE 2 DIABETES MELLITUS: ICD-10-CM

## 2024-03-19 RX ORDER — ROSUVASTATIN CALCIUM 20 MG/1
20 TABLET, COATED ORAL DAILY
Qty: 90 TABLET | Refills: 3 | Status: SHIPPED | OUTPATIENT
Start: 2024-03-19 | End: 2025-03-19

## 2024-03-19 RX ORDER — INSULIN GLARGINE 100 [IU]/ML
44 INJECTION, SOLUTION SUBCUTANEOUS DAILY
Qty: 39.6 ML | Refills: 3 | Status: SHIPPED | OUTPATIENT
Start: 2024-03-19 | End: 2025-03-19

## 2024-03-19 NOTE — TELEPHONE ENCOUNTER
----- Message from Amber Le MA sent at 3/15/2024 12:38 PM CDT -----    ----- Message -----  From: Trinidad Luong  Sent: 3/15/2024  11:12 AM CDT  To: Maritza Hernandez Staff    Patient is calling back in regards to medication not being cover by insurance and would like a alternative for [facility] mail order. Please call her back at [phone]          Thanks  sol

## 2024-03-19 NOTE — TELEPHONE ENCOUNTER
Refill request. Patient advised if medication is not covered to please contact her insurance for a formulary list.

## 2024-03-22 ENCOUNTER — DOCUMENTATION ONLY (OUTPATIENT)
Dept: OBSTETRICS AND GYNECOLOGY | Facility: CLINIC | Age: 69
End: 2024-03-22
Payer: MEDICARE

## 2024-03-26 ENCOUNTER — CLINICAL SUPPORT (OUTPATIENT)
Dept: OBSTETRICS AND GYNECOLOGY | Facility: CLINIC | Age: 69
End: 2024-03-26
Payer: MEDICARE

## 2024-03-26 ENCOUNTER — OFFICE VISIT (OUTPATIENT)
Dept: PRIMARY CARE CLINIC | Facility: CLINIC | Age: 69
End: 2024-03-26
Payer: MEDICARE

## 2024-03-26 ENCOUNTER — TELEPHONE (OUTPATIENT)
Dept: PRIMARY CARE CLINIC | Facility: CLINIC | Age: 69
End: 2024-03-26

## 2024-03-26 VITALS
RESPIRATION RATE: 16 BRPM | WEIGHT: 214 LBS | HEIGHT: 63 IN | BODY MASS INDEX: 37.92 KG/M2 | HEART RATE: 64 BPM | DIASTOLIC BLOOD PRESSURE: 65 MMHG | SYSTOLIC BLOOD PRESSURE: 121 MMHG | TEMPERATURE: 98 F | OXYGEN SATURATION: 95 %

## 2024-03-26 DIAGNOSIS — N18.31 CHRONIC KIDNEY DISEASE, STAGE 3A: ICD-10-CM

## 2024-03-26 DIAGNOSIS — Z01.89 ROUTINE LAB DRAW: Primary | ICD-10-CM

## 2024-03-26 DIAGNOSIS — D84.9 IMMUNOSUPPRESSION: ICD-10-CM

## 2024-03-26 DIAGNOSIS — E66.01 SEVERE OBESITY (BMI 35.0-39.9) WITH COMORBIDITY: ICD-10-CM

## 2024-03-26 DIAGNOSIS — E11.42 DIABETIC POLYNEUROPATHY ASSOCIATED WITH TYPE 2 DIABETES MELLITUS: Primary | ICD-10-CM

## 2024-03-26 LAB — GLUCOSE SERPL-MCNC: 145 MG/DL (ref 70–110)

## 2024-03-26 PROCEDURE — 36415 COLL VENOUS BLD VENIPUNCTURE: CPT | Mod: S$GLB,,, | Performed by: INTERNAL MEDICINE

## 2024-03-26 PROCEDURE — 95251 CONT GLUC MNTR ANALYSIS I&R: CPT | Mod: S$GLB,,, | Performed by: INTERNAL MEDICINE

## 2024-03-26 PROCEDURE — 82962 GLUCOSE BLOOD TEST: CPT | Mod: ,,, | Performed by: INTERNAL MEDICINE

## 2024-03-26 PROCEDURE — 99214 OFFICE O/P EST MOD 30 MIN: CPT | Mod: 25,S$GLB,, | Performed by: INTERNAL MEDICINE

## 2024-03-26 RX ORDER — DAPAGLIFLOZIN 5 MG/1
5 TABLET, FILM COATED ORAL DAILY
Qty: 30 TABLET | Refills: 1 | Status: SHIPPED | OUTPATIENT
Start: 2024-03-26

## 2024-03-26 NOTE — TELEPHONE ENCOUNTER
----- Message from Lucila Montalvo sent at 3/26/2024  2:11 PM CDT -----  Regarding: Return Call  Contact: patient  Type:  Patient Returning Call    Who Called:Tiffany   Who Left Message for Patient:nurse   Does the patient know what this is regarding?: blood work   Would the patient rather a call back or a response via [facility]?  Call back   Best Call Back Number: [phone]   Additional Information:     SAMUEL Champion

## 2024-03-26 NOTE — PROGRESS NOTES
Subjective:      Patient ID: Tiffany Adams is a 68 y.o. female.    Chief Complaint: Diabetes (F/u )    : Patient with h/o ESRD s/p Relative donor Renal Transplant in Dec/2019. Patient reports that she is doing well. Patient is on Immunosuppressants and is tolerating it well.  Patient last GFR of 49 is decreased but stable.    Patient has type 2 diabetes with last A1c of 6.4 is at goal.  Patient also has CGM the data is downloaded and it suggest that average blood sugar is running 236 with standard of 62.  Patient blood sugars range 19% of the time running high for 44% of the time and very high 37% of the time.  Patient is prescribed Basaglar 44 units and FiAsp 12 units before breakfast, 16 before lunch and 18 before dinner.  Patient was out of short-acting insulin for quite some time and that contributed to high blood sugar.  Patient has received the insulin and now is taking insulin more regularly.  Patient blood sugars are better control in last few days.      Review of Systems   Constitutional:  Negative for chills, diaphoresis, fever, malaise/fatigue and weight loss.   HENT:  Negative for congestion, ear pain, sinus pain, sore throat and tinnitus.    Eyes:  Negative for blurred vision and photophobia.   Respiratory:  Negative for cough, hemoptysis, shortness of breath and wheezing.    Cardiovascular:  Negative for chest pain, palpitations, orthopnea, leg swelling and PND.   Gastrointestinal:  Negative for abdominal pain, blood in stool, constipation, diarrhea, heartburn, melena, nausea and vomiting.   Genitourinary:  Negative for dysuria, frequency and urgency.   Musculoskeletal:  Negative for back pain, myalgias and neck pain.   Skin:  Negative for rash.   Neurological:  Negative for dizziness, tremors, seizures, loss of consciousness and weakness.   Endo/Heme/Allergies:  Negative for polydipsia.   Psychiatric/Behavioral:  Negative for depression and hallucinations. The patient does not have insomnia.   "    Objective:   /65 (BP Location: Right arm, Patient Position: Sitting, BP Method: Medium (Automatic))   Pulse 64   Temp 97.9 °F (36.6 °C) (Oral)   Resp 16   Ht 5' 3" (1.6 m)   Wt 97.1 kg (214 lb)   SpO2 95%   BMI 37.91 kg/m²     Physical Exam:  Patient not examined  Assessment:       ICD-10-CM ICD-9-CM   1. Diabetic polyneuropathy associated with type 2 diabetes mellitus  E11.42 250.60     357.2   2. Severe obesity (BMI 35.0-39.9) with comorbidity  E66.01 278.01   3. Chronic kidney disease, stage 3a  N18.31 585.3   4. Immunosuppression  D84.9 279.9       Plan:     Patient with diabetes with last A1c of 6.4 is at goal  Patient CGM data suggest that blood sugars were not under good control  But in last few days blood sugars seem to have improved as she has started taking pre meal insulin  Advised patient to adhere with medication  Will add Farxiga, Medicine should help with blood sugar, weight loss and with chronic kidney disease  Patient has obesity with BMI more than 35 and hypertension and diabetes  Farxiga should help with weight loss.  If blood sugars still stay high will consider adding Ozempic  Patient had live donor renal transplant..  Patient is doing well    Medication List with Changes/Refills   New Medications    DAPAGLIFLOZIN PROPANEDIOL (FARXIGA) 5 MG TAB TABLET    Take 1 tablet (5 mg total) by mouth once daily.   Current Medications    BASAGLAR KWIKPEN U-100 INSULIN GLARGINE 100 UNITS/ML SUBQ PEN    Inject 44 Units into the skin once daily.    BLOOD SUGAR DIAGNOSTIC STRP    Use to check blood glucose 3 times daily    DULOXETINE (CYMBALTA) 60 MG CAPSULE    Take 1 capsule (60 mg total) by mouth once daily.    GABAPENTIN (NEURONTIN) 300 MG CAPSULE    Take 1 capsule (300 mg total) by mouth 3 (three) times daily.    HYDROCODONE-ACETAMINOPHEN (NORCO)  MG PER TABLET    Take 1 tablet by mouth every 8 (eight) hours as needed.    INSULIN ASPART, NIACINAMIDE, (FIASP FLEXTOUCH U-100 INSULIN) " "100 UNIT/ML (3 ML) INPN    Aspart Insulin 12 units before breakfast 16 for lunch and 18 for dinner.    LANCETS (ONETOUCH DELICA LANCETS) 33 GAUGE MISC    Use as directed to check blood glucose 3 times daily    METOPROLOL SUCCINATE (TOPROL-XL) 50 MG 24 HR TABLET    Take 1 tablet (50 mg total) by mouth once daily.    MYCOPHENOLATE (MYFORTIC) 180 MG TBEC    Take 180 mg by mouth 2 (two) times daily.    MYCOPHENOLATE (MYFORTIC) 360 MG TBEC    Take 360 mg by mouth once daily. Take 360 mg in the morning and 180 mg in the evening    PEN NEEDLE, DIABETIC (BD ULTRA-FINE MINI PEN NEEDLE) 31 GAUGE X 3/16" NDLE    1 each by Misc.(Non-Drug; Combo Route) route 4 (four) times daily.    PRENATAL VITAMIN 27 MG IRON- 0.8 MG TAB    Take 1 tablet by mouth once daily.    ROSUVASTATIN (CRESTOR) 20 MG TABLET    Take 1 tablet (20 mg total) by mouth once daily.    TACROLIMUS (PROGRAF) 1 MG CAP    Take 3 capsules by mouth once daily. Then 2 caps nightly        "

## 2024-03-27 LAB
ALBUMIN SERPL BCP-MCNC: 3.5 G/DL (ref 3.4–5)
ALP SERPL-CCNC: 212 U/L (ref 45–117)
ALT SERPL W P-5'-P-CCNC: 30 U/L (ref 13–56)
ANION GAP SERPL CALC-SCNC: 4 MMOL/L (ref 3–11)
AST SERPL-CCNC: 25 U/L (ref 15–37)
BILIRUB SERPL-MCNC: 0.5 MG/DL (ref 0.2–1)
BUN SERPL-MCNC: 20 MG/DL (ref 7–18)
BUN/CREAT SERPL: 19.04 RATIO
CALCIUM SERPL-MCNC: 9.2 MG/DL (ref 8.5–10.1)
CHLORIDE SERPL-SCNC: 104 MMOL/L (ref 98–107)
CO2 SERPL-SCNC: 28 MMOL/L (ref 21–32)
CREAT SERPL-MCNC: 1.05 MG/DL (ref 0.55–1.02)
ESTIMATED AVG GLUCOSE: 168 MG/DL
GFR ESTIMATION: 52
GLUCOSE SERPL-MCNC: 94 MG/DL (ref 74–106)
HBA1C MFR BLD: 6.9 % (ref 4.2–6.3)
POTASSIUM SERPL-SCNC: 4.3 MMOL/L (ref 3.5–5.1)
PROT SERPL-MCNC: 6.6 G/DL (ref 6.4–8.2)
SODIUM BLD-SCNC: 136 MMOL/L (ref 131–143)

## 2024-04-04 ENCOUNTER — TELEPHONE (OUTPATIENT)
Dept: PRIMARY CARE CLINIC | Facility: CLINIC | Age: 69
End: 2024-04-04
Payer: MEDICARE

## 2024-04-04 NOTE — TELEPHONE ENCOUNTER
S/w Patient, she states co pay for Farxiga is $400 and she cannot afford it and would like to change it to something else. Patient is advised to contact her insurance for a formulary.

## 2024-06-27 ENCOUNTER — CLINICAL SUPPORT (OUTPATIENT)
Dept: OBSTETRICS AND GYNECOLOGY | Facility: CLINIC | Age: 69
End: 2024-06-27
Payer: MEDICARE

## 2024-06-27 ENCOUNTER — OFFICE VISIT (OUTPATIENT)
Dept: PRIMARY CARE CLINIC | Facility: CLINIC | Age: 69
End: 2024-06-27
Payer: MEDICARE

## 2024-06-27 VITALS
TEMPERATURE: 98 F | WEIGHT: 214.31 LBS | SYSTOLIC BLOOD PRESSURE: 103 MMHG | DIASTOLIC BLOOD PRESSURE: 68 MMHG | HEIGHT: 63 IN | BODY MASS INDEX: 37.97 KG/M2 | RESPIRATION RATE: 18 BRPM | OXYGEN SATURATION: 96 % | HEART RATE: 56 BPM

## 2024-06-27 DIAGNOSIS — M79.7 FIBROMYALGIA: ICD-10-CM

## 2024-06-27 DIAGNOSIS — F33.1 MODERATE EPISODE OF RECURRENT MAJOR DEPRESSIVE DISORDER: ICD-10-CM

## 2024-06-27 DIAGNOSIS — E11.42 DIABETIC POLYNEUROPATHY ASSOCIATED WITH TYPE 2 DIABETES MELLITUS: Primary | ICD-10-CM

## 2024-06-27 DIAGNOSIS — R00.0 SINUS TACHYCARDIA: ICD-10-CM

## 2024-06-27 DIAGNOSIS — Z01.89 ROUTINE LAB DRAW: Primary | ICD-10-CM

## 2024-06-27 LAB
ALBUMIN SERPL BCP-MCNC: 3.6 G/DL (ref 3.4–5)
ALP SERPL-CCNC: 196 U/L (ref 45–117)
ALT SERPL W P-5'-P-CCNC: 35 U/L (ref 13–56)
ANION GAP SERPL CALC-SCNC: 9 MMOL/L (ref 3–11)
AST SERPL-CCNC: 29 U/L (ref 15–37)
BILIRUB SERPL-MCNC: 0.8 MG/DL (ref 0.2–1)
BUN SERPL-MCNC: 26 MG/DL (ref 7–18)
BUN/CREAT SERPL: 20.47 RATIO
CALCIUM SERPL-MCNC: 9.3 MG/DL (ref 8.5–10.1)
CHLORIDE SERPL-SCNC: 102 MMOL/L (ref 98–107)
CO2 SERPL-SCNC: 27 MMOL/L (ref 21–32)
CREAT SERPL-MCNC: 1.27 MG/DL (ref 0.55–1.02)
ESTIMATED AVG GLUCOSE: 147 MG/DL
GFR ESTIMATION: 42
GLUCOSE SERPL-MCNC: 221 MG/DL (ref 70–110)
GLUCOSE SERPL-MCNC: 223 MG/DL (ref 74–106)
HBA1C MFR BLD: 6.3 % (ref 4.2–6.3)
POTASSIUM SERPL-SCNC: 4.9 MMOL/L (ref 3.5–5.1)
PROT SERPL-MCNC: 7 G/DL (ref 6.4–8.2)
SODIUM BLD-SCNC: 138 MMOL/L (ref 131–143)

## 2024-06-27 RX ORDER — METOPROLOL SUCCINATE 25 MG/1
25 TABLET, EXTENDED RELEASE ORAL DAILY
Qty: 90 TABLET | Refills: 3 | Status: SHIPPED | OUTPATIENT
Start: 2024-06-27 | End: 2025-06-27

## 2024-06-27 RX ORDER — TIZANIDINE 2 MG/1
4 TABLET ORAL EVERY 6 HOURS PRN
Qty: 30 TABLET | Refills: 0 | Status: SHIPPED | OUTPATIENT
Start: 2024-06-27 | End: 2024-06-28

## 2024-06-27 RX ORDER — BUPROPION HYDROCHLORIDE 150 MG/1
150 TABLET ORAL DAILY
Qty: 30 TABLET | Refills: 11 | Status: SHIPPED | OUTPATIENT
Start: 2024-06-27 | End: 2025-06-27

## 2024-06-27 NOTE — PROGRESS NOTES
Subjective:      Patient ID: Tiffany Adams is a 68 y.o. female.    Chief Complaint: Diabetes    HPI: Patient with h/o ESRD s/p Relative donor Renal Transplant in Dec/2019. Patient reports that she is doing well. Patient is on Immunosuppressants and is tolerating it well.  Patient last GFR of 49 is decreased but stable.     Please with last A1c of 6.9 worsen from 6.4.  Patient has CGM and the data is downloaded that suggest that average blood sugar is running 174 with standard deviation of 57.  Patient blood sugars are in range 59% of the time running high 31% of the time and very high 9% of the time.  A prominent spike is noted after breakfast at about 9:00 a.m. Patient is prescribed Basaglar 44 units and FiAsp 12 units before breakfast, 16 before lunch and 18 before dinner.     Patient also has history of fibromyalgia with pains all over.  Patient reports pain can be anywhere in the body sometimes hurting all over and sometimes in different joints.     Review of Systems   Constitutional:  Negative for chills, diaphoresis, fever, malaise/fatigue and weight loss.   HENT:  Negative for congestion, ear pain, sinus pain, sore throat and tinnitus.    Eyes:  Negative for blurred vision and photophobia.   Respiratory:  Negative for cough, hemoptysis, shortness of breath and wheezing.    Cardiovascular:  Negative for chest pain, palpitations, orthopnea, leg swelling and PND.   Gastrointestinal:  Negative for abdominal pain, blood in stool, constipation, diarrhea, heartburn, melena, nausea and vomiting.   Genitourinary:  Negative for dysuria, frequency and urgency.   Musculoskeletal:  Positive for joint pain and myalgias. Negative for back pain and neck pain.   Skin:  Negative for rash.   Neurological:  Negative for dizziness, tremors, seizures, loss of consciousness and weakness.   Endo/Heme/Allergies:  Negative for polydipsia.   Psychiatric/Behavioral:  Negative for depression and hallucinations. The patient does not  "have insomnia.      Objective:   BP (!) 101/59 (BP Location: Left arm, Patient Position: Sitting, BP Method: Large (Automatic))   Pulse (!) 56   Temp 98.3 °F (36.8 °C) (Oral)   Resp 18   Ht 5' 3" (1.6 m)   Wt 97.2 kg (214 lb 4.8 oz)   SpO2 96%   BMI 37.96 kg/m²     Physical Exam  Constitutional:       General: She is not in acute distress.     Appearance: She is not diaphoretic.   Neck:      Thyroid: No thyromegaly.   Cardiovascular:      Rate and Rhythm: Normal rate and regular rhythm.      Heart sounds: Normal heart sounds. No murmur heard.  Pulmonary:      Effort: Pulmonary effort is normal. No respiratory distress.      Breath sounds: Normal breath sounds. No wheezing.   Chest:      Chest wall: No tenderness.   Abdominal:      General: Bowel sounds are normal. There is no distension.      Palpations: Abdomen is soft.      Tenderness: There is no abdominal tenderness.   Musculoskeletal:         General: No tenderness.      Comments: Diffuse tenderness noted anterior chest, upper and lower back, lateral side of bilateral shoulder, elbows, knees, ankles, hips   Lymphadenopathy:      Cervical: No cervical adenopathy.   Neurological:      Mental Status: She is alert and oriented to person, place, and time.      Cranial Nerves: No cranial nerve deficit.      Sensory: No sensory deficit.   Psychiatric:         Behavior: Behavior normal.         Thought Content: Thought content normal.         Judgment: Judgment normal.       Assessment:       ICD-10-CM ICD-9-CM   1. Diabetic polyneuropathy associated with type 2 diabetes mellitus  E11.42 250.60     357.2   2. Fibromyalgia  M79.7 729.1   3. Moderate episode of recurrent major depressive disorder  F33.1 296.32       Plan:     Patient with diabetes with last A1c of 6.9 is at goal rather on lower side.  Patient has a spike in blood sugar after breakfast  Advised patient to  cut down on carbohydrate in breakfast  Will increase pre breakfast insulin from 12---> 14 " "units  Will repeat A1c and CMP  Patient blood pressures are on lower side and patient reports some dizziness on and off  Will repeat blood pressure..  Repeat blood pressure is low as well  Will decrease metoprolol from 50-25 mg  Patient is off of Farxiga.  Will not restart the medicine  Cymbalta can drop blood pressure too..   Patient reports depression that is not under control.  Will add Wellbutrin  Cymbalta is helping with depression and possible fibromyalgia..  Will continue medication  Will add tizanidine for muscular pain    Medication List with Changes/Refills   Current Medications    BASAGLAR KWIKPEN U-100 INSULIN GLARGINE 100 UNITS/ML SUBQ PEN    Inject 44 Units into the skin once daily.    BLOOD SUGAR DIAGNOSTIC STRP    Use to check blood glucose 3 times daily    DAPAGLIFLOZIN PROPANEDIOL (FARXIGA) 5 MG TAB TABLET    Take 1 tablet (5 mg total) by mouth once daily.    DULOXETINE (CYMBALTA) 60 MG CAPSULE    Take 1 capsule (60 mg total) by mouth once daily.    GABAPENTIN (NEURONTIN) 300 MG CAPSULE    Take 1 capsule (300 mg total) by mouth 3 (three) times daily.    HYDROCODONE-ACETAMINOPHEN (NORCO)  MG PER TABLET    Take 1 tablet by mouth every 8 (eight) hours as needed.    INSULIN ASPART, NIACINAMIDE, (FIASP FLEXTOUCH U-100 INSULIN) 100 UNIT/ML (3 ML) INPN    Aspart Insulin 12 units before breakfast 16 for lunch and 18 for dinner.    LANCETS (ONETOUCH DELICA LANCETS) 33 GAUGE MISC    Use as directed to check blood glucose 3 times daily    METOPROLOL SUCCINATE (TOPROL-XL) 50 MG 24 HR TABLET    Take 1 tablet (50 mg total) by mouth once daily.    MYCOPHENOLATE (MYFORTIC) 180 MG TBEC    Take 180 mg by mouth 2 (two) times daily.    PEN NEEDLE, DIABETIC (BD ULTRA-FINE MINI PEN NEEDLE) 31 GAUGE X 3/16" NDLE    1 each by Misc.(Non-Drug; Combo Route) route 4 (four) times daily.    PRENATAL VITAMIN 27 MG IRON- 0.8 MG TAB    Take 1 tablet by mouth once daily.    ROSUVASTATIN (CRESTOR) 20 MG TABLET    Take 1 tablet " (20 mg total) by mouth once daily.    TACROLIMUS (PROGRAF) 1 MG CAP    Take 3 capsules by mouth once daily. Then 2 caps nightly

## 2024-06-28 DIAGNOSIS — M79.7 FIBROMYALGIA: ICD-10-CM

## 2024-06-28 RX ORDER — TIZANIDINE 2 MG/1
4 TABLET ORAL EVERY 6 HOURS PRN
Qty: 30 TABLET | Refills: 0 | Status: SHIPPED | OUTPATIENT
Start: 2024-06-28

## 2024-07-12 ENCOUNTER — TELEPHONE (OUTPATIENT)
Dept: PRIMARY CARE CLINIC | Facility: CLINIC | Age: 69
End: 2024-07-12
Payer: MEDICARE

## 2024-07-12 NOTE — TELEPHONE ENCOUNTER
----- Message from yDana Basurto sent at 7/12/2024  9:50 AM CDT -----  Name of Who is Calling:  pt         What is the request in detail:believes she's having side effects to medication    tiZANidine (ZANAFLEX) 2 MG tablet 30 tablet 0 6/28/2024 - No  Sig - Route: TAKE 2 TABLETS BY MOUTH EVERY 6 HOURS AS NEEDED - Oral  Sent to pharmacy as: tiZANidine (ZANAFLEX) 2 MG tablet  Class: Normal  Order: [id number]  Date/Time Signed: 6/28/2024 09:23      E-Prescribing Status: Receipt confirmed by pharmacy (6/28/2024  9:23 AM CDT)        Can the clinic reply by [facility]:no           What Number to Call Back if not in [facility]: [phone]

## 2024-07-12 NOTE — TELEPHONE ENCOUNTER
Spoke with patient advised patient to hold on taking the medication to see if symptoms improved and to call back with update

## 2024-08-02 ENCOUNTER — OFFICE VISIT (OUTPATIENT)
Dept: PRIMARY CARE CLINIC | Facility: CLINIC | Age: 69
End: 2024-08-02
Payer: MEDICARE

## 2024-08-02 VITALS
DIASTOLIC BLOOD PRESSURE: 60 MMHG | RESPIRATION RATE: 16 BRPM | TEMPERATURE: 98 F | BODY MASS INDEX: 38.18 KG/M2 | SYSTOLIC BLOOD PRESSURE: 120 MMHG | HEART RATE: 78 BPM | HEIGHT: 63 IN | WEIGHT: 215.5 LBS | OXYGEN SATURATION: 96 %

## 2024-08-02 DIAGNOSIS — F33.1 MODERATE EPISODE OF RECURRENT MAJOR DEPRESSIVE DISORDER: ICD-10-CM

## 2024-08-02 DIAGNOSIS — E66.01 SEVERE OBESITY (BMI 35.0-39.9) WITH COMORBIDITY: ICD-10-CM

## 2024-08-02 DIAGNOSIS — N18.31 CHRONIC KIDNEY DISEASE, STAGE 3A: ICD-10-CM

## 2024-08-02 DIAGNOSIS — E11.42 DIABETIC POLYNEUROPATHY ASSOCIATED WITH TYPE 2 DIABETES MELLITUS: Primary | ICD-10-CM

## 2024-08-02 LAB — GLUCOSE SERPL-MCNC: 116 MG/DL (ref 70–110)

## 2024-08-02 NOTE — PROGRESS NOTES
Subjective:      Patient ID: Tiffany Adams is a 68 y.o. female.    Chief Complaint: Diabetes (1month f/u )    : Patient with h/o ESRD s/p Relative donor Renal Transplant in Dec/2019. Patient reports that she is doing well. Patient is on Immunosuppressants and is tolerating it well.  Patient last GFR of 49 is decreased but stable.     Patient with diabetes with last A1c of 6.3 is at goal.  Patient has CGM data downloaded and it suggest that average blood sugar is running 177 with standard deviation of 60.  Patient blood sugars are in range 57% of the time running high 30% of the time and very high 13% of the time.  Spikes are noted at mealtimes and patient reports taking FiAsp 14 units before breakfast, 16 units before lunch and 18 units before dinner.  Patient is also taking Basaglar 44 units.    Patient has fibromyalgia and is on Cymbalta with much help.  Patient also has depression and is taking Wellbutrin and Cymbalta and symptoms are under good control..    Review of Systems   Constitutional:  Negative for chills, diaphoresis, fever, malaise/fatigue and weight loss.   HENT:  Positive for congestion. Negative for ear pain, sinus pain, sore throat and tinnitus.    Eyes:  Negative for blurred vision and photophobia.   Respiratory:  Positive for cough. Negative for hemoptysis, shortness of breath and wheezing.    Cardiovascular:  Negative for chest pain, palpitations, orthopnea, leg swelling and PND.   Gastrointestinal:  Negative for abdominal pain, blood in stool, constipation, diarrhea, heartburn, melena, nausea and vomiting.   Genitourinary:  Negative for dysuria, frequency and urgency.   Musculoskeletal:  Negative for back pain, myalgias and neck pain.   Skin:  Negative for rash.   Neurological:  Negative for dizziness, tremors, seizures, loss of consciousness and weakness.   Endo/Heme/Allergies:  Negative for polydipsia.   Psychiatric/Behavioral:  Negative for depression and hallucinations. The patient does  "not have insomnia.      Objective:   /60 (BP Location: Right arm, Patient Position: Sitting, BP Method: Large (Automatic))   Pulse 78   Temp 98 °F (36.7 °C) (Oral)   Resp 16   Ht 5' 3" (1.6 m)   Wt 97.8 kg (215 lb 8 oz)   SpO2 96%   BMI 38.17 kg/m²     Physical Exam: Patient not examined   Assessment:       ICD-10-CM ICD-9-CM   1. Diabetic polyneuropathy associated with type 2 diabetes mellitus  E11.42 250.60     357.2   2. Chronic kidney disease, stage 3a  N18.31 585.3   3. Severe obesity (BMI 35.0-39.9) with comorbidity  E66.01 278.01   4. Moderate episode of recurrent major depressive disorder  F33.1 296.32       Plan:     Patient with diabetes with last A1c of 6.3 is at goal.  CGM data suggest that blood sugar after meals are high specially breakfast and dinner  Will increase pre meal insulin to 16 units before breakfast and lunch and 20 units before dinner  Will repeat labs on return  Patient did not tolerate Farxiga and could not afford GLP 1  Patient has hypertension and blood pressure seem under good control.  Will continue medication  Patient depression and fibromyalgia is under good control  Will continue medication  Patient last LDL of 59 is at goal.  Advised patient about need to lose weight      Medication List with Changes/Refills   Current Medications    BASAGLAR KWIKPEN U-100 INSULIN GLARGINE 100 UNITS/ML SUBQ PEN    Inject 44 Units into the skin once daily.    BLOOD SUGAR DIAGNOSTIC STRP    Use to check blood glucose 3 times daily    BUPROPION (WELLBUTRIN XL) 150 MG TB24 TABLET    Take 1 tablet (150 mg total) by mouth once daily.    DULOXETINE (CYMBALTA) 60 MG CAPSULE    Take 1 capsule (60 mg total) by mouth once daily.    GABAPENTIN (NEURONTIN) 300 MG CAPSULE    Take 1 capsule (300 mg total) by mouth 3 (three) times daily.    INSULIN ASPART, NIACINAMIDE, (FIASP FLEXTOUCH U-100 INSULIN) 100 UNIT/ML (3 ML) INPN    Aspart Insulin 12 units before breakfast 16 for lunch and 18 for dinner. " "   LANCETS (ONETOUCH DELICA LANCETS) 33 GAUGE MISC    Use as directed to check blood glucose 3 times daily    METOPROLOL SUCCINATE (TOPROL-XL) 25 MG 24 HR TABLET    Take 1 tablet (25 mg total) by mouth once daily.    MYCOPHENOLATE (MYFORTIC) 180 MG TBEC    Take 180 mg by mouth 2 (two) times daily.    PEN NEEDLE, DIABETIC (BD ULTRA-FINE MINI PEN NEEDLE) 31 GAUGE X 3/16" NDLE    1 each by Misc.(Non-Drug; Combo Route) route 4 (four) times daily.    PRENATAL VITAMIN 27 MG IRON- 0.8 MG TAB    Take 1 tablet by mouth once daily.    ROSUVASTATIN (CRESTOR) 20 MG TABLET    Take 1 tablet (20 mg total) by mouth once daily.    TACROLIMUS (PROGRAF) 1 MG CAP    Take 3 capsules by mouth once daily. Then 2 caps nightly   Discontinued Medications    TIZANIDINE (ZANAFLEX) 2 MG TABLET    TAKE 2 TABLETS BY MOUTH EVERY 6 HOURS AS NEEDED        "

## 2024-10-03 ENCOUNTER — OFFICE VISIT (OUTPATIENT)
Dept: PRIMARY CARE CLINIC | Facility: CLINIC | Age: 69
End: 2024-10-03
Payer: MEDICARE

## 2024-10-03 VITALS
DIASTOLIC BLOOD PRESSURE: 62 MMHG | TEMPERATURE: 98 F | WEIGHT: 220 LBS | HEIGHT: 63 IN | HEART RATE: 68 BPM | BODY MASS INDEX: 38.98 KG/M2 | OXYGEN SATURATION: 98 % | RESPIRATION RATE: 16 BRPM | SYSTOLIC BLOOD PRESSURE: 114 MMHG

## 2024-10-03 DIAGNOSIS — N18.6 TYPE 2 DIABETES MELLITUS WITH END-STAGE RENAL DISEASE: ICD-10-CM

## 2024-10-03 DIAGNOSIS — Z23 FLU VACCINE NEED: Primary | ICD-10-CM

## 2024-10-03 DIAGNOSIS — N18.32 STAGE 3B CHRONIC KIDNEY DISEASE: ICD-10-CM

## 2024-10-03 DIAGNOSIS — E11.22 TYPE 2 DIABETES MELLITUS WITH END-STAGE RENAL DISEASE: ICD-10-CM

## 2024-10-03 DIAGNOSIS — R10.13 EPIGASTRIC PAIN: ICD-10-CM

## 2024-10-03 LAB — GLUCOSE SERPL-MCNC: 221 MG/DL (ref 70–110)

## 2024-10-03 RX ORDER — PANTOPRAZOLE SODIUM 40 MG/1
40 TABLET, DELAYED RELEASE ORAL DAILY
Qty: 90 TABLET | Refills: 3 | Status: SHIPPED | OUTPATIENT
Start: 2024-10-03 | End: 2025-10-03

## 2024-10-03 NOTE — PROGRESS NOTES
Subjective:      Patient ID: Tiffany Adams is a 68 y.o. female.    Chief Complaint: Diabetes (2month f/u) and Chest Pain (Mid chest pain, radiates through to back, last episode yesterday, tingling in top of head)     Patient with h/o ESRD s/p Relative donor Renal Transplant in Dec/2019. Patient reports that she is doing well. Patient is on Immunosuppressants and is tolerating it well.  Patient last GFR of 49 is decreased but stable.     Patient with diabetes with last A1c of 6.3 was at goal.  Patient has CGM and the data is downloaded that suggest that average blood sugar is running 153 with standard deviation of 51.  Patient blood sugars are in range 69% of the time running high 25% of the time very high 3% of the time.  Patient has some low blood sugar 2% of the time and very low 1% of the time.  Multiple hypoglycemic episodes are noted between 1--3 a.m. patient is on Basaglar 44 units and short-acting insulin 16 units before breakfast 18 units before lunch and 20 before supper.    Patient complains of epigastric area pain x 3 weeks.  Pain is intermittent, sharp, radiates to the back, 7-8/10 in intensity, pain is mostly after food, patient denies any burning in the stomach, no blood in stool or black color stool.  Patient denies any specific food causing the pain.  Patient reports she has cut down on the food because of pain.  But patient seem to have gained 5 lb      Review of Systems   Constitutional:  Negative for chills, diaphoresis, fever, malaise/fatigue and weight loss (5 lb weight gain).   HENT:  Negative for congestion, ear pain, sinus pain, sore throat and tinnitus.    Eyes:  Negative for blurred vision and photophobia.   Respiratory:  Negative for cough, hemoptysis, shortness of breath and wheezing.    Cardiovascular:  Positive for chest pain (Patient reports chest pain but pain is in epigastric area.). Negative for palpitations, orthopnea, leg swelling and PND.   Gastrointestinal:  Negative for  "abdominal pain, blood in stool, constipation, diarrhea, heartburn, melena, nausea and vomiting.   Genitourinary:  Negative for dysuria, frequency and urgency.   Musculoskeletal:  Negative for back pain, myalgias and neck pain.   Skin:  Negative for rash.   Neurological:  Negative for dizziness, tremors, seizures, loss of consciousness and weakness.   Endo/Heme/Allergies:  Negative for polydipsia.   Psychiatric/Behavioral:  Negative for depression and hallucinations. The patient does not have insomnia.      Objective:   /62 (BP Location: Right arm, Patient Position: Sitting)   Pulse 68   Temp 97.7 °F (36.5 °C) (Oral)   Resp 16   Ht 5' 3" (1.6 m)   Wt 99.8 kg (220 lb)   SpO2 98%   BMI 38.97 kg/m²     Physical Exam:  Patient not examined  Assessment:       ICD-10-CM ICD-9-CM   1. Flu vaccine need  Z23 V04.81   2. Type 2 diabetes mellitus with end-stage renal disease  E11.22 250.40    N18.6 585.6   3. Stage 3b chronic kidney disease  N18.32 585.3   4. Epigastric pain  R10.13 789.06       Plan:     Patient with type 2 diabetes with last A1c of 6.3 is below target  Patient is still having hypoglycemic episode early in the morning.  Will decrease Basaglar from 44---> 40 units  Patient has decrease meals consumption Will decrease pre meal insulin to 14 units before breakfast  16 units before lunch and 18 units before dinner  Patient has chronic kidney disease after renal transplant  Patient is being followed by transplant clinic  Patient has symptoms suggestive of peptic ulcer disease.  Will use PPI    Medication List with Changes/Refills   Current Medications    BASAGLAR KWIKPEN U-100 INSULIN GLARGINE 100 UNITS/ML SUBQ PEN    Inject 44 Units into the skin once daily.    BLOOD SUGAR DIAGNOSTIC STRP    Use to check blood glucose 3 times daily    BUPROPION (WELLBUTRIN XL) 150 MG TB24 TABLET    Take 1 tablet (150 mg total) by mouth once daily.    DULOXETINE (CYMBALTA) 60 MG CAPSULE    Take 1 capsule (60 mg total) " "by mouth once daily.    GABAPENTIN (NEURONTIN) 300 MG CAPSULE    Take 1 capsule (300 mg total) by mouth 3 (three) times daily.    INSULIN ASPART, NIACINAMIDE, (FIASP FLEXTOUCH U-100 INSULIN) 100 UNIT/ML (3 ML) INPN    Aspart Insulin 12 units before breakfast 16 for lunch and 18 for dinner.    LANCETS (ONETOUCH DELICA LANCETS) 33 GAUGE MISC    Use as directed to check blood glucose 3 times daily    METOPROLOL SUCCINATE (TOPROL-XL) 25 MG 24 HR TABLET    Take 1 tablet (25 mg total) by mouth once daily.    MYCOPHENOLATE (MYFORTIC) 180 MG TBEC    Take 180 mg by mouth 2 (two) times daily.    PEN NEEDLE, DIABETIC (BD ULTRA-FINE MINI PEN NEEDLE) 31 GAUGE X 3/16" NDLE    1 each by Misc.(Non-Drug; Combo Route) route 4 (four) times daily.    PRENATAL VITAMIN 27 MG IRON- 0.8 MG TAB    Take 1 tablet by mouth once daily.    ROSUVASTATIN (CRESTOR) 20 MG TABLET    Take 1 tablet (20 mg total) by mouth once daily.    TACROLIMUS (PROGRAF) 1 MG CAP    Take 3 capsules by mouth once daily. Then 2 caps nightly        "

## 2024-10-04 ENCOUNTER — CLINICAL SUPPORT (OUTPATIENT)
Dept: OBSTETRICS AND GYNECOLOGY | Facility: CLINIC | Age: 69
End: 2024-10-04
Payer: MEDICARE

## 2024-10-04 DIAGNOSIS — Z01.89 ROUTINE LAB DRAW: Primary | ICD-10-CM

## 2024-10-04 LAB
ALBUMIN SERPL BCP-MCNC: 3.8 G/DL (ref 3.4–5)
ALBUMIN/GLOBULIN RATIO: 1.2 RATIO (ref 1.1–1.8)
ALP SERPL-CCNC: 150 U/L (ref 45–117)
ALT SERPL W P-5'-P-CCNC: 31 U/L (ref 13–56)
ANION GAP SERPL CALC-SCNC: 6 MMOL/L (ref 3–11)
AST SERPL-CCNC: 19 U/L (ref 15–37)
BASOPHILS NFR BLD: 1 % (ref 0–3)
BILIRUB SERPL-MCNC: 0.7 MG/DL (ref 0.2–1)
BUN SERPL-MCNC: 19 MG/DL (ref 7–18)
BUN/CREAT SERPL: 15.57 RATIO
CALCIUM SERPL-MCNC: 9.3 MG/DL (ref 8.5–10.1)
CHLORIDE SERPL-SCNC: 106 MMOL/L (ref 98–107)
CHOLEST SERPL-MSCNC: 128 MG/DL
CO2 SERPL-SCNC: 29 MMOL/L (ref 21–32)
CREAT SERPL-MCNC: 1.22 MG/DL (ref 0.55–1.02)
CREATININE, URINE: 140.9 MG/DL (ref 10–175)
EOSINOPHIL NFR BLD: 3.7 % (ref 0–6)
ERYTHROCYTE [DISTWIDTH] IN BLOOD BY AUTOMATED COUNT: 14.5 % (ref 0–15.5)
GFR ESTIMATION: 44
GLOBULIN: 3.1 G/DL (ref 2.3–3.5)
GLUCOSE SERPL-MCNC: 160 MG/DL (ref 74–106)
HBA1C MFR BLD: 6.5 % (ref 4.2–6.3)
HCT VFR BLD AUTO: 41.6 % (ref 37–47)
HDL/CHOLESTEROL RATIO: 2.3 RATIO
HDLC SERPL-MCNC: 56 MG/DL
HGB BLD-MCNC: 13.7 G/DL (ref 12–16)
LDLC SERPL CALC-MCNC: 38.4 MG/DL
LYMPHOCYTES NFR BLD: 4.1 % (ref 20–45)
MCH RBC QN AUTO: 32.1 PG (ref 27–32)
MCHC RBC AUTO-ENTMCNC: 32.9 % (ref 32–36)
MCV RBC AUTO: 97.4 FL (ref 80–99)
MICROALBUMIN URINE: 15.4 MG/L (ref 0–20)
MICROALBUMIN/CREATININE RATIO: 10 MG/G (ref 0–30)
MONOCYTES NFR BLD: 8.9 % (ref 2–10)
NEUTROPHILS # BLD AUTO: 6 10*3/UL (ref 1.4–7)
NEUTROPHILS NFR BLD: 81.5 % (ref 50–80)
NUCLEATED RED BLOOD CELLS: 0 % (ref 0–0.2)
PLATELETS: 217 10*3/UL (ref 130–400)
POTASSIUM SERPL-SCNC: 5.2 MMOL/L (ref 3.5–5.1)
PROT SERPL-MCNC: 6.9 G/DL (ref 6.4–8.2)
RBC # BLD AUTO: 4.27 10*6/UL (ref 4.2–5.4)
SODIUM BLD-SCNC: 141 MMOL/L (ref 131–143)
TRIGL SERPL-MCNC: 168 MG/DL (ref 0–149)
TSH SERPL DL<=0.005 MIU/L-ACNC: 1.62 UIU/ML (ref 0.36–3.74)
VLDL CHOLESTEROL: 34 MG/DL
WBC # BLD: 7.3 10*3/UL (ref 4.5–10)

## 2024-10-07 ENCOUNTER — CLINICAL SUPPORT (OUTPATIENT)
Dept: OBSTETRICS AND GYNECOLOGY | Facility: CLINIC | Age: 69
End: 2024-10-07
Payer: MEDICARE

## 2024-10-07 DIAGNOSIS — Z01.89 ROUTINE LAB DRAW: Primary | ICD-10-CM

## 2024-10-07 DIAGNOSIS — E87.5 HYPERKALEMIA: Primary | ICD-10-CM

## 2024-12-01 DIAGNOSIS — E11.42 DIABETIC POLYNEUROPATHY ASSOCIATED WITH TYPE 2 DIABETES MELLITUS: ICD-10-CM

## 2024-12-02 RX ORDER — GABAPENTIN 300 MG/1
300 CAPSULE ORAL 3 TIMES DAILY
Qty: 90 CAPSULE | Refills: 11 | Status: SHIPPED | OUTPATIENT
Start: 2024-12-02

## 2024-12-16 LAB
LEFT EYE DM RETINOPATHY: NEGATIVE
RIGHT EYE DM RETINOPATHY: NEGATIVE

## 2024-12-26 ENCOUNTER — PATIENT OUTREACH (OUTPATIENT)
Dept: ADMINISTRATIVE | Facility: HOSPITAL | Age: 69
End: 2024-12-26
Payer: MEDICARE

## 2025-01-30 DIAGNOSIS — M79.7 FIBROMYALGIA: ICD-10-CM

## 2025-01-30 DIAGNOSIS — F32.89 OTHER DEPRESSION: ICD-10-CM

## 2025-01-30 RX ORDER — DULOXETIN HYDROCHLORIDE 60 MG/1
60 CAPSULE, DELAYED RELEASE ORAL
Qty: 90 CAPSULE | Refills: 3 | Status: SHIPPED | OUTPATIENT
Start: 2025-01-30

## 2025-02-04 ENCOUNTER — TELEPHONE (OUTPATIENT)
Dept: PRIMARY CARE CLINIC | Facility: CLINIC | Age: 70
End: 2025-02-04
Payer: MEDICARE

## 2025-02-04 NOTE — TELEPHONE ENCOUNTER
----- Message from Maude sent at 2/4/2025  9:25 AM CST -----  Contact: Tiffany  Type:  Needs Medical Advice    Who Called: Tiffany   Symptoms (please be specific):   How long has patient had these symptoms:    Pharmacy name and phone   [id number] - [place], [place] - [address] [place] [address]  Phone: [phone] Fax: [phone]  Would the patient rather a call back or a response via [facility]? Call back   Best Call Back Number: [phone]  Additional Information: Patient is calling in regards to she states that she has been out of her insulin for two weeks due to it being on back order at the pharmacy. She will like to know if another medication can be prescribed in the place of her insulin, she just didn't wont her sugar to go any higher.She see  on 2/6/25 and she just wants to make sure her numbers are good.

## 2025-02-04 NOTE — TELEPHONE ENCOUNTER
Returned call to patient, she states her pharmacy is not able to get Fiasp Flextouch U-100 insulin (aspart). Advised patient to check with another pharmacy and let me know.

## 2025-02-06 ENCOUNTER — OFFICE VISIT (OUTPATIENT)
Dept: PRIMARY CARE CLINIC | Facility: CLINIC | Age: 70
End: 2025-02-06
Payer: MEDICARE

## 2025-02-06 VITALS
RESPIRATION RATE: 18 BRPM | WEIGHT: 219.63 LBS | DIASTOLIC BLOOD PRESSURE: 74 MMHG | HEART RATE: 83 BPM | HEIGHT: 63 IN | TEMPERATURE: 98 F | SYSTOLIC BLOOD PRESSURE: 106 MMHG | BODY MASS INDEX: 38.91 KG/M2 | OXYGEN SATURATION: 97 %

## 2025-02-06 DIAGNOSIS — N18.31 CHRONIC KIDNEY DISEASE, STAGE 3A: ICD-10-CM

## 2025-02-06 DIAGNOSIS — I10 ESSENTIAL HYPERTENSION: ICD-10-CM

## 2025-02-06 DIAGNOSIS — E11.22 TYPE 2 DIABETES MELLITUS WITH END-STAGE RENAL DISEASE: Primary | ICD-10-CM

## 2025-02-06 DIAGNOSIS — N18.6 TYPE 2 DIABETES MELLITUS WITH END-STAGE RENAL DISEASE: Primary | ICD-10-CM

## 2025-02-06 LAB — GLUCOSE SERPL-MCNC: 327 MG/DL (ref 70–110)

## 2025-02-06 PROCEDURE — 99214 OFFICE O/P EST MOD 30 MIN: CPT | Mod: S$PBB,,, | Performed by: INTERNAL MEDICINE

## 2025-02-06 PROCEDURE — 95251 CONT GLUC MNTR ANALYSIS I&R: CPT | Mod: ,,, | Performed by: INTERNAL MEDICINE

## 2025-02-06 NOTE — PROGRESS NOTES
Subjective:      Patient ID: Tiffany Adams is a 69 y.o. female.    Chief Complaint: No chief complaint on file.    HPI:  Patient with h/o ESRD s/p Relative donor Renal Transplant in Dec/2019. Patient reports that she is doing well. Patient is on Immunosuppressants and is tolerating it well.  Patient last GFR of 49 is decreased but stable.     With diabetes with last A1c of 6.5 suggest blood sugars are under good control.  Patient has CGM and the data is downloaded that suggest that average blood sugar is running 277 with standard deviation of 69.  Patient blood sugars are in range 9% of the time only running high 27% of the time and very high 64% of the time.  Patient is taking Basaglar 44 units the prescribed dose was 40 units.  Patient has increase it by 4 units to compensate for missing short-acting insulin.  Patient was taking lispro 16 before breakfast, 18 before lunch and 20 before dinner.  Patient is out of short-acting insulin for last 2 weeks and insurance is not able to procure the medication.  Patient reports the medicine is available today.     Patient on last visit complained of epigastric pain and was given PPI with much improvement in symptoms.  Patient also has obstructive sleep apnea and is on CPAP machine.  Patient reports using CPAP machine regularly    Review of Systems   Constitutional:  Negative for chills, diaphoresis, fever, malaise/fatigue and weight loss.   HENT:  Negative for congestion, ear pain, sinus pain, sore throat and tinnitus.    Eyes:  Negative for blurred vision and photophobia.   Respiratory:  Negative for cough, hemoptysis, shortness of breath and wheezing.    Cardiovascular:  Negative for chest pain, palpitations, orthopnea, leg swelling and PND.   Gastrointestinal:  Negative for abdominal pain, blood in stool, constipation, diarrhea, heartburn, melena, nausea and vomiting.   Genitourinary:  Negative for dysuria, frequency and urgency.   Musculoskeletal:  Negative for back  "pain, myalgias and neck pain.   Skin:  Negative for rash.   Neurological:  Negative for dizziness, tremors, seizures, loss of consciousness and weakness.   Endo/Heme/Allergies:  Negative for polydipsia.   Psychiatric/Behavioral:  Negative for depression and hallucinations. The patient does not have insomnia.      Objective:   /74 (BP Location: Right arm, Patient Position: Sitting)   Pulse 83   Temp 98.3 °F (36.8 °C) (Oral)   Resp 18   Ht 5' 3" (1.6 m)   Wt 99.6 kg (219 lb 9.6 oz)   SpO2 97%   BMI 38.90 kg/m²     Physical Exam: Patient is vitally stable with no acute distress    Assessment:       ICD-10-CM ICD-9-CM   1. Type 2 diabetes mellitus with end-stage renal disease  E11.22 250.40    N18.6 585.6       Plan:     Patient with end-stage renal disease s/p live donor transplant  Patient is being followed by [facility] transplant clinic   Will refer to Dr. Richter for local care.   Patient has hypertension and blood pressure seem under good control  Will continue same medication  Patient has diabetes with last A1c of 6.5 is at goal  Patient CGM data suggest very poorly controlled blood sugar.   Advised patient to start pre meal insulin as prescribed  Will repeat A1c and CMP  Patient has obstructive sleep apnea and is being followed by sleep Medicine  Patient is using CPAP machine regularly      Medication List with Changes/Refills   Current Medications    BASAGLAR KWIKPEN U-100 INSULIN GLARGINE 100 UNITS/ML SUBQ PEN    Inject 44 Units into the skin once daily.    BLOOD SUGAR DIAGNOSTIC STRP    Use to check blood glucose 3 times daily    BUPROPION (WELLBUTRIN XL) 150 MG TB24 TABLET    Take 1 tablet (150 mg total) by mouth once daily.    DULOXETINE (CYMBALTA) 60 MG CAPSULE    TAKE 1 CAPSULE(60 MG) BY MOUTH EVERY DAY    GABAPENTIN (NEURONTIN) 300 MG CAPSULE    TAKE 1 CAPSULE(300 MG) BY MOUTH THREE TIMES DAILY    INSULIN ASPART, NIACINAMIDE, (FIASP FLEXTOUCH U-100 INSULIN) 100 UNIT/ML (3 ML) INPN    Aspart " "Insulin 12 units before breakfast 16 for lunch and 18 for dinner.    LANCETS (ONETOUCH DELICA LANCETS) 33 GAUGE MISC    Use as directed to check blood glucose 3 times daily    METOPROLOL SUCCINATE (TOPROL-XL) 25 MG 24 HR TABLET    Take 1 tablet (25 mg total) by mouth once daily.    MYCOPHENOLATE (MYFORTIC) 180 MG TBEC    Take 180 mg by mouth 2 (two) times daily.    PANTOPRAZOLE (PROTONIX) 40 MG TABLET    Take 1 tablet (40 mg total) by mouth once daily.    PEN NEEDLE, DIABETIC (BD ULTRA-FINE MINI PEN NEEDLE) 31 GAUGE X 3/16" NDLE    1 each by Misc.(Non-Drug; Combo Route) route 4 (four) times daily.    PRENATAL VITAMIN 27 MG IRON- 0.8 MG TAB    Take 1 tablet by mouth once daily.    ROSUVASTATIN (CRESTOR) 20 MG TABLET    Take 1 tablet (20 mg total) by mouth once daily.    TACROLIMUS (PROGRAF) 1 MG CAP    Take 3 capsules by mouth once daily. Then 2 caps nightly        "

## 2025-02-07 ENCOUNTER — TELEPHONE (OUTPATIENT)
Dept: PRIMARY CARE CLINIC | Facility: CLINIC | Age: 70
End: 2025-02-07
Payer: MEDICARE

## 2025-02-07 NOTE — TELEPHONE ENCOUNTER
----- Message from Crystal sent at 2/7/2025 10:25 AM CST -----  Contact: DRISS MALDONADO [[id number]]  ..Type:  Patient Requesting Call    Who Called: DRISS MALDONADO [[id number]]  What is the call regarding?: pt is trying to have her medications sent through [facility] mail order. Would like a call back.   Would the patient rather a call back or a response via [facility]?  call  Best Call Back Number: [phone] (home)  Additional Information:  also her  would like the same for his medication.

## 2025-02-11 ENCOUNTER — TELEPHONE (OUTPATIENT)
Dept: PRIMARY CARE CLINIC | Facility: CLINIC | Age: 70
End: 2025-02-11
Payer: MEDICARE

## 2025-02-11 NOTE — TELEPHONE ENCOUNTER
----- Message from Crystal sent at 2/11/2025  2:17 PM CST -----  Contact: DRISS MALDONADO [[id number]]  ..Type:  Patient Requesting Call    Who Called: DRISS MALDONADO [[id number]]   What is the call regarding?: returning call  Would the patient rather a call back or a response via [facility]?  call  Best Call Back Number: [phone] (home)   Additional Information:

## 2025-02-11 NOTE — TELEPHONE ENCOUNTER
Returned call to patient, she states she did not realize mail pharmacy would need to be a 90 day supply and she decided to go with a local pharmacy instead and will give us a call back if she needs refills.

## 2025-02-28 DIAGNOSIS — E11.42 DIABETIC POLYNEUROPATHY ASSOCIATED WITH TYPE 2 DIABETES MELLITUS: ICD-10-CM

## 2025-02-28 RX ORDER — INSULIN GLARGINE 100 [IU]/ML
INJECTION, SOLUTION SUBCUTANEOUS
Qty: 39 ML | Refills: 3 | Status: SHIPPED | OUTPATIENT
Start: 2025-02-28